# Patient Record
Sex: MALE | Race: AMERICAN INDIAN OR ALASKA NATIVE | Employment: FULL TIME | URBAN - METROPOLITAN AREA
[De-identification: names, ages, dates, MRNs, and addresses within clinical notes are randomized per-mention and may not be internally consistent; named-entity substitution may affect disease eponyms.]

---

## 2018-04-10 ENCOUNTER — HOSPITAL ENCOUNTER (EMERGENCY)
Age: 58
Discharge: HOME | End: 2018-04-10
Attending: EMERGENCY MEDICINE
Payer: COMMERCIAL

## 2018-04-10 VITALS
RESPIRATION RATE: 16 BRPM | HEART RATE: 64 BPM | OXYGEN SATURATION: 98 % | WEIGHT: 192 LBS | HEIGHT: 73 IN | BODY MASS INDEX: 25.45 KG/M2 | SYSTOLIC BLOOD PRESSURE: 166 MMHG | TEMPERATURE: 98.5 F | DIASTOLIC BLOOD PRESSURE: 97 MMHG

## 2018-04-10 DIAGNOSIS — L03.90 CELLULITIS, UNSPECIFIED CELLULITIS SITE: Primary | ICD-10-CM

## 2018-04-10 LAB
ALBUMIN SERPL-MCNC: 4 G/DL (ref 3.5–5)
ALBUMIN/GLOB SERPL: 1.1 {RATIO} (ref 1.1–2.2)
ALP SERPL-CCNC: 99 U/L (ref 45–117)
ALT SERPL-CCNC: 36 U/L (ref 12–78)
ANION GAP SERPL CALC-SCNC: 5 MMOL/L (ref 5–15)
AST SERPL-CCNC: 30 U/L (ref 15–37)
BASOPHILS # BLD: 0 K/UL (ref 0–0.1)
BASOPHILS NFR BLD: 0 % (ref 0–1)
BILIRUB SERPL-MCNC: 1.5 MG/DL (ref 0.2–1)
BUN SERPL-MCNC: 14 MG/DL (ref 6–20)
BUN/CREAT SERPL: 18 (ref 12–20)
CALCIUM SERPL-MCNC: 8.8 MG/DL (ref 8.5–10.1)
CHLORIDE SERPL-SCNC: 107 MMOL/L (ref 97–108)
CO2 SERPL-SCNC: 28 MMOL/L (ref 21–32)
CREAT SERPL-MCNC: 0.78 MG/DL (ref 0.7–1.3)
DIFFERENTIAL METHOD BLD: ABNORMAL
EOSINOPHIL # BLD: 0 K/UL (ref 0–0.4)
EOSINOPHIL NFR BLD: 0 % (ref 0–7)
ERYTHROCYTE [DISTWIDTH] IN BLOOD BY AUTOMATED COUNT: 12.5 % (ref 11.5–14.5)
GLOBULIN SER CALC-MCNC: 3.8 G/DL (ref 2–4)
GLUCOSE SERPL-MCNC: 109 MG/DL (ref 65–100)
HCT VFR BLD AUTO: 42.6 % (ref 36.6–50.3)
HGB BLD-MCNC: 14.3 G/DL (ref 12.1–17)
IMM GRANULOCYTES # BLD: 0 K/UL (ref 0–0.04)
IMM GRANULOCYTES NFR BLD AUTO: 1 % (ref 0–0.5)
LYMPHOCYTES # BLD: 1 K/UL (ref 0.8–3.5)
LYMPHOCYTES NFR BLD: 26 % (ref 12–49)
MCH RBC QN AUTO: 34.1 PG (ref 26–34)
MCHC RBC AUTO-ENTMCNC: 33.6 G/DL (ref 30–36.5)
MCV RBC AUTO: 101.7 FL (ref 80–99)
MONOCYTES # BLD: 0.4 K/UL (ref 0–1)
MONOCYTES NFR BLD: 11 % (ref 5–13)
NEUTS SEG # BLD: 2.5 K/UL (ref 1.8–8)
NEUTS SEG NFR BLD: 63 % (ref 32–75)
NRBC # BLD: 0 K/UL (ref 0–0.01)
NRBC BLD-RTO: 0 PER 100 WBC
PLATELET # BLD AUTO: 144 K/UL (ref 150–400)
PMV BLD AUTO: 9.4 FL (ref 8.9–12.9)
POTASSIUM SERPL-SCNC: 4 MMOL/L (ref 3.5–5.1)
PROT SERPL-MCNC: 7.8 G/DL (ref 6.4–8.2)
RBC # BLD AUTO: 4.19 M/UL (ref 4.1–5.7)
SODIUM SERPL-SCNC: 140 MMOL/L (ref 136–145)
WBC # BLD AUTO: 4 K/UL (ref 4.1–11.1)

## 2018-04-10 PROCEDURE — 93971 EXTREMITY STUDY: CPT

## 2018-04-10 PROCEDURE — 80053 COMPREHEN METABOLIC PANEL: CPT | Performed by: EMERGENCY MEDICINE

## 2018-04-10 PROCEDURE — 99283 EMERGENCY DEPT VISIT LOW MDM: CPT

## 2018-04-10 PROCEDURE — 74011250637 HC RX REV CODE- 250/637: Performed by: EMERGENCY MEDICINE

## 2018-04-10 PROCEDURE — 85025 COMPLETE CBC W/AUTO DIFF WBC: CPT | Performed by: EMERGENCY MEDICINE

## 2018-04-10 PROCEDURE — 36415 COLL VENOUS BLD VENIPUNCTURE: CPT | Performed by: EMERGENCY MEDICINE

## 2018-04-10 RX ORDER — CEPHALEXIN 500 MG/1
500 CAPSULE ORAL
Status: COMPLETED | OUTPATIENT
Start: 2018-04-10 | End: 2018-04-10

## 2018-04-10 RX ORDER — CEPHALEXIN 500 MG/1
500 CAPSULE ORAL 4 TIMES DAILY
Qty: 28 CAP | Refills: 0 | Status: SHIPPED | OUTPATIENT
Start: 2018-04-10 | End: 2018-04-17

## 2018-04-10 RX ADMIN — CEPHALEXIN 500 MG: 500 CAPSULE ORAL at 11:28

## 2018-04-10 NOTE — ED TRIAGE NOTES
Pt reports he was sent here from Dr Thais Humphrey's office to R/O cellulitis and blood clot. Pt has noted redness and swelling to right lower leg. Pt states it started 2 days ago. Pt also C/O right knee pain for the past few weeks. Pt denies injury to leg.

## 2018-04-10 NOTE — PROCEDURES
[address]  *** FINAL REPORT ***    Name: Parth Lima  MRN: [id number]  : 17 Dec 1960  HIS Order #: [id number]  [id number] [facility] Visit #: [id number]  Date: 10 Apr 2018    TYPE OF TEST: Peripheral Venous Testing    REASON FOR TEST  Pain in limb, Limb swelling, cellulitis    Right Leg:-  Deep venous thrombosis:           No  Superficial venous thrombosis:    No  Deep venous insufficiency:        Not examined  Superficial venous insufficiency: Not examined      INTERPRETATION/FINDINGS  PROCEDURE:  Color duplex ultrasound imaging of lower extremity veins. FINDINGS:       Right: The common femoral, deep femoral, femoral, popliteal,  posterior tibial, peroneal, and great saphenous are patent and without   evidence of thrombus; each is is fully compressible and there is no  narrowing of the flow channel on color Doppler imaging. Phasic flow  is observed in the common femoral vein. Left:   The common femoral vein is patent and without evidence of   thrombus. Phasic flow is observed. This extremity was not otherwise   evaluated. IMPRESSION:  No evidence of right lower extremity vein thrombosis. ADDITIONAL COMMENTS    I have personally reviewed the data relevant to the interpretation of  this  study.     TECHNOLOGIST: Aneesh Mares RVT  Signed: 04/10/2018 11:02 AM    PHYSICIAN: Tara Esposito MD  Signed: 2018 06:30 AM

## 2018-04-10 NOTE — LETTER
[address] 
[address] [facility] [address] 
[phone] Work/School Note Date: 4/10/2018 To Whom It May concern: Mariel Trotter was seen and treated today in the emergency room by the following provider(s): 
Attending Provider: Shakir Pimentel MD. Mariel Trotter may return to work on 4/13/18.  
 
Sincerely, 
 
 
 
 
Shakir Pimentel MD

## 2018-04-10 NOTE — ED PROVIDER NOTES
HPI Comments: 61 y.o. male with no significant past medical history who presents with chief complaint of right lower leg swelling. Patient reports right knee pain began two weeks ago and yesterday he noticed redness, swelling, and warmth to right lower leg. Patient has no prior history of similar symptoms. Patient denies fever, chest pain, shortness of breath, abdominal pain, vomiting. There are no other acute medical concerns at this time. Social hx: Nonsmoker  PCP: Bhupinder Hilton MD    Note written by Rhonda Mar, as dictated by Cole Foster MD 10:42 AM      The history is provided by the patient. Past Medical History:   Diagnosis Date    Environmental and seasonal allergies        Past Surgical History:   Procedure Laterality Date    HX ORTHOPAEDIC           History reviewed. No pertinent family history. Social History     Social History    Marital status:      Spouse name: N/A    Number of children: N/A    Years of education: N/A     Occupational History    Not on file. Social History Main Topics    Smoking status: Never Smoker    Smokeless tobacco: Never Used    Alcohol use 6.0 oz/week     10 Cans of beer per week    Drug use: No    Sexual activity: Not on file     Other Topics Concern    Not on file     Social History Narrative    No narrative on file         ALLERGIES: Review of patient's allergies indicates no known allergies. Review of Systems   Constitutional: Negative for fever. Eyes: Negative for visual disturbance. Respiratory: Negative for cough, shortness of breath and wheezing. Cardiovascular: Positive for leg swelling (right lower leg). Negative for chest pain. Gastrointestinal: Negative for abdominal pain, diarrhea, nausea and vomiting. Genitourinary: Negative for dysuria. Musculoskeletal: Negative. Negative for back pain and neck stiffness. Skin: Positive for color change (redness to right lower leg). Negative for rash. Neurological: Negative. Negative for syncope and headaches. Psychiatric/Behavioral: Negative for confusion. All other systems reviewed and are negative. Vitals:    04/10/18 0947   BP: 165/90   Pulse: 69   Resp: 16   Temp: 98 °F (36.7 °C)   SpO2: 99%   Weight: 87.1 kg (192 lb)   Height: 6' 1\" (1.854 m)            Physical Exam   Constitutional: He appears well-developed and well-nourished. No distress. HENT:   Head: Normocephalic. Eyes: Pupils are equal, round, and reactive to light. Neck: Normal range of motion. Cardiovascular: Normal rate and regular rhythm. No murmur heard. Pulmonary/Chest: Effort normal and breath sounds normal. No respiratory distress. Abdominal: Soft. There is no tenderness. Musculoskeletal: Normal range of motion. He exhibits edema. Right lower leg: He exhibits edema (1+ edema). Right lower leg: redness and warmth, edema. Pulses intact. Neurological: He is alert. He has normal strength. No cranial nerve deficit. Skin: Skin is warm and dry. Psychiatric: He has a normal mood and affect. His behavior is normal.   Nursing note and vitals reviewed. Note written by Jose Delcid, as dictated by Kimberly Sullivan MD 10:43 AM       [facility]      ED Course       Procedures    Discussed test results, clinical impression,  and need for followup in 1-2 days if not improving. Patients questions were answered. Discharge instructions given to patient.

## 2018-10-15 ENCOUNTER — HOSPITAL ENCOUNTER (OUTPATIENT)
Dept: PREADMISSION TESTING | Age: 58
Discharge: HOME | End: 2018-10-15
Payer: COMMERCIAL

## 2018-10-15 VITALS
BODY MASS INDEX: 26.03 KG/M2 | TEMPERATURE: 98 F | DIASTOLIC BLOOD PRESSURE: 87 MMHG | SYSTOLIC BLOOD PRESSURE: 159 MMHG | WEIGHT: 196.38 LBS | HEIGHT: 73 IN | HEART RATE: 65 BPM

## 2018-10-15 LAB
ABO + RH BLD: NORMAL
ANION GAP SERPL CALC-SCNC: 7 MMOL/L (ref 5–15)
APPEARANCE UR: CLEAR
ATRIAL RATE: 57 BPM
BACTERIA URNS QL MICRO: NEGATIVE /HPF
BILIRUB UR QL: NEGATIVE
BLOOD GROUP ANTIBODIES SERPL: NORMAL
BUN SERPL-MCNC: 12 MG/DL (ref 6–20)
BUN/CREAT SERPL: 13 (ref 12–20)
CALCIUM SERPL-MCNC: 8.9 MG/DL (ref 8.5–10.1)
CALCULATED P AXIS, ECG09: 11 DEGREES
CALCULATED R AXIS, ECG10: -34 DEGREES
CALCULATED T AXIS, ECG11: 84 DEGREES
CHLORIDE SERPL-SCNC: 104 MMOL/L (ref 97–108)
CO2 SERPL-SCNC: 28 MMOL/L (ref 21–32)
COLOR UR: NORMAL
CREAT SERPL-MCNC: 0.89 MG/DL (ref 0.7–1.3)
DIAGNOSIS, 93000: NORMAL
EPITH CASTS URNS QL MICRO: NORMAL /LPF
ERYTHROCYTE [DISTWIDTH] IN BLOOD BY AUTOMATED COUNT: 11.9 % (ref 11.5–14.5)
EST. AVERAGE GLUCOSE BLD GHB EST-MCNC: 105 MG/DL
GLUCOSE SERPL-MCNC: 102 MG/DL (ref 65–100)
GLUCOSE UR STRIP.AUTO-MCNC: NEGATIVE MG/DL
HBA1C MFR BLD: 5.3 % (ref 4.2–6.3)
HCT VFR BLD AUTO: 43.3 % (ref 36.6–50.3)
HGB BLD-MCNC: 15 G/DL (ref 12.1–17)
HGB UR QL STRIP: NEGATIVE
HYALINE CASTS URNS QL MICRO: NORMAL /LPF (ref 0–5)
INR PPP: 1 (ref 0.9–1.1)
KETONES UR QL STRIP.AUTO: NEGATIVE MG/DL
LEUKOCYTE ESTERASE UR QL STRIP.AUTO: NEGATIVE
MCH RBC QN AUTO: 35.6 PG (ref 26–34)
MCHC RBC AUTO-ENTMCNC: 34.6 G/DL (ref 30–36.5)
MCV RBC AUTO: 102.9 FL (ref 80–99)
NITRITE UR QL STRIP.AUTO: NEGATIVE
NRBC # BLD: 0 K/UL (ref 0–0.01)
NRBC BLD-RTO: 0 PER 100 WBC
P-R INTERVAL, ECG05: 206 MS
PH UR STRIP: 6.5 [PH] (ref 5–8)
PLATELET # BLD AUTO: 164 K/UL (ref 150–400)
PMV BLD AUTO: 10.1 FL (ref 8.9–12.9)
POTASSIUM SERPL-SCNC: 4.1 MMOL/L (ref 3.5–5.1)
PROT UR STRIP-MCNC: NEGATIVE MG/DL
PROTHROMBIN TIME: 9.9 SEC (ref 9–11.1)
Q-T INTERVAL, ECG07: 492 MS
QRS DURATION, ECG06: 166 MS
QTC CALCULATION (BEZET), ECG08: 478 MS
RBC # BLD AUTO: 4.21 M/UL (ref 4.1–5.7)
RBC #/AREA URNS HPF: NORMAL /HPF (ref 0–5)
SODIUM SERPL-SCNC: 139 MMOL/L (ref 136–145)
SP GR UR REFRACTOMETRY: 1.01 (ref 1–1.03)
SPECIMEN EXP DATE BLD: NORMAL
UA: UC IF INDICATED,UAUC: NORMAL
UROBILINOGEN UR QL STRIP.AUTO: 0.2 EU/DL (ref 0.2–1)
VENTRICULAR RATE, ECG03: 57 BPM
WBC # BLD AUTO: 3.5 K/UL (ref 4.1–11.1)
WBC URNS QL MICRO: NORMAL /HPF (ref 0–4)

## 2018-10-15 PROCEDURE — 85610 PROTHROMBIN TIME: CPT | Performed by: ORTHOPAEDIC SURGERY

## 2018-10-15 PROCEDURE — 80048 BASIC METABOLIC PNL TOTAL CA: CPT | Performed by: ORTHOPAEDIC SURGERY

## 2018-10-15 PROCEDURE — 81001 URINALYSIS AUTO W/SCOPE: CPT | Performed by: ORTHOPAEDIC SURGERY

## 2018-10-15 PROCEDURE — 93005 ELECTROCARDIOGRAM TRACING: CPT

## 2018-10-15 PROCEDURE — 85027 COMPLETE CBC AUTOMATED: CPT | Performed by: ORTHOPAEDIC SURGERY

## 2018-10-15 PROCEDURE — 86900 BLOOD TYPING SEROLOGIC ABO: CPT | Performed by: ORTHOPAEDIC SURGERY

## 2018-10-15 PROCEDURE — 83036 HEMOGLOBIN GLYCOSYLATED A1C: CPT | Performed by: ORTHOPAEDIC SURGERY

## 2018-10-15 RX ORDER — MONTELUKAST SODIUM 10 MG/1
10 TABLET ORAL
COMMUNITY
End: 2022-02-04

## 2018-10-16 PROBLEM — I44.7 LBBB (LEFT BUNDLE BRANCH BLOCK): Status: ACTIVE | Noted: 2018-10-16

## 2018-10-16 LAB
BACTERIA SPEC CULT: NORMAL
BACTERIA SPEC CULT: NORMAL
SERVICE CMNT-IMP: NORMAL

## 2018-10-16 NOTE — ADVANCED PRACTICE NURSE
PAT EKG with new LBBB. Per Dr. Mary Verduzco, patient needs cardiac evaluation prior to surgery. I have contacted patient to inform, ensured no cardiac hx or previous LBBB. Patient's wife, Yanet Botello, will set up appointment with her cardiologist, Dr. Martha Casey and return call with time and date. I have called to inform Dr. Amber Hope team, message left for [place], [place]. Yanet Croon called to confirm appt with Dr. Mireille Ayala  [phone] 10/23.

## 2018-10-25 NOTE — ADVANCED PRACTICE NURSE
Per patient, surgery cancelled due to need of further cardiac evaluation, including cardiac cath. I have requested cardiac notes from Dr. Ioana Gillepsie office. Patient and cardiologist have informed Dr. Laddie Phoenix team. Surgery cancelled in [address] [place] [address].

## 2018-11-01 PROBLEM — M17.31 POST-TRAUMATIC OSTEOARTHRITIS OF RIGHT KNEE: Status: ACTIVE | Noted: 2018-11-01

## 2018-11-01 NOTE — ADVANCED PRACTICE NURSE
Per Dr Francisco J Mar nurse, patient had cardiac cath and has been cleared for surgery. Cardiac clearance note received and sent for scanning.

## 2018-11-01 NOTE — H&P
[]Wes for attribution information Subjective:  
Patient ID: Cherry Britton is a 61 y.o. male. Pain Score:   5 Chief Complaint: Follow-up of the Right Knee 
  
  
24-year-old male now seven weeks out from his knee scope. He has really no better. He continues to have pain and swelling in the knee and overall he is really uncomfortable. He is back in for re-evaluation and possible discussion of total knee replacement. His pain is continued to be most mostly medial.  He has fairly uncomfortable and it has gotten swollen again despite the aspiration injection last time. He just does not seem to making any progress and is frustrated. Had a lot of members of his family have knee replacement and they have done well. They all had total knee replacements and not partials. 
  
Medical History Past Medical History:  
Diagnosis Date  no relevant past medical history    
  
  
Surgical History Past Surgical History:  
Procedure Laterality Date  HAND SURGERY      
 KNEE SURGERY      
 NO RELEVANT SURGERIES      
 SPINE SURGERY      
  
  
     
Family History Problem Relation Age of Onset  No Known Problems Mother    
 No Known Problems Father    
 No Known Problems Brother    
 No Known Problems Sister    
 Clotting disorder Brother    
  
Social History Social History  
  
     
Social History  Marital status:   
    Spouse name: N/A  
 Number of children: N/A  
 Years of education: N/A  
  
    
Social History Main Topics  Smoking status: Never Smoker  Smokeless tobacco: Never Used  Alcohol use Yes  Drug use: No  
 Sexual activity: Not Asked  
  
    
Other Topics Concern  None  
  
Social History Narrative  None  
  
  
Review of Systems 10/9/2018 
  
Constitutional: Unexplained: Negative Genitourinary: Frequent Urination: Negative HEENT: Vision Loss: Negative Neurological: Memory Loss: Negative Integumentary: Rash: Negative Cardiovascular: Palpatations: Negative Hematologic: Bruises/Bleeds Easily: Negative Gastrointestinal: Constipation: Negative Immunological: Seasonal Allergies: Positive Musculoskeletal: Joint Pain: Positive Objective:  
  
Vitals Vitals:  
  10/09/18 0250 Weight: 192 lb Height: 6' 1\"  
  
  
Constitutional:  No acute distress. Well nourished. Well developed. Psychiatric: Alert and oriented x3. Skin:  No marked skin ulcers. Neurological:  No apparent deficits 
  
 
Patient Active Problem List  
 Diagnosis Date Noted  Osteoarthritis of right knee 11/02/2018  Post-traumatic osteoarthritis of right knee 11/01/2018  LBBB (left bundle branch block) 10/16/2018 Past Medical History:  
Diagnosis Date  Arthritis  Chronic pain  Environmental and seasonal allergies  LBBB (left bundle branch block) 10/16/2018  Nausea & vomiting Past Surgical History:  
Procedure Laterality Date  HX CERVICAL FUSION  2006  HX GI    
 COLONOSCOPY  
 [address]  HX ORTHOPAEDIC Left 2009 HAND SURGERY   
 [address] [facility] Prior to Admission medications Medication Sig Start Date End Date Taking? Authorizing Provider  
montelukast (SINGULAIR) 10 mg tablet Take 10 mg by mouth nightly. Yes Provider, Historical  
 
No Known Allergies Social History Tobacco Use  Smoking status: Never Smoker  Smokeless tobacco: Never Used Substance Use Topics  Alcohol use: Yes Alcohol/week: 3.6 oz Types: 6 Cans of beer per week Family History Problem Relation Age of Onset  Cancer Father JAW  Anesth Problems Neg Hx Patient Vitals for the past 8 hrs: 
 BP Temp Pulse Resp SpO2 Height Weight  
11/02/18 0822 (!) 147/92 98.5 °F (36.9 °C) 61 16 97 % 6' 1\" (1.854 m) 88.9 kg (196 lb) General appearance: alert, cooperative, no distress, appears stated age Head: Normocephalic, without obvious abnormality, atraumatic Lungs: clear to auscultation bilaterally Heart: regular rate and rhythm, S1, S2 normal, no murmur Abdomen: soft, non-tender. Bowel sounds normal. No masses,  no organomegaly Extremities: He walks with an antalgic gait wearing his  brace on his affected right knee. Right knee:  No skin changes, rashes, erythema, deformity, or bruising. Moderate effusion. Tenderness in the medial joint line. Mild varus alignment. Pseudo opening on valgus stress testing. No opening on varus or anterior-posterior stress testing. Slight crepitance along the medial joint line. Negative Lachman's negative drawer. Normal patellar tracking. Good quad and hamstring strength good pulses good sensation good cap refill and no edema distally. Range of motion is basically full. Left knee exam has normal alignment and range of motion with no pain. There is good stability in all planes and no crepitance and no effusion. The Lachman's and drawer are negative. The knee is stable to varus and valgus stress testing. The patella tracks well. There is no joint line tenderness. The leg is neurovascular intact distally with no skin changes rashes erythema deformity or bruising about the leg. There is no edema and good pulses distally. Pulses: 2+ and symmetric Neurologic: Grossly normal 
 
 
 
 
  
Radiographs:   
  
  
 No imaging obtained   
  
Assessment:  
  
1. Primary osteoarthritis of right knee 2. Effusion, right knee   
  
   
Patient Active Problem List  
Diagnosis  Acute deep vein thrombosis (DVT) of proximal vein of left lower extremity  Primary osteoarthritis of right knee  
  
Plan:  
  
He has failed conservative management with injection rest activity modification bracing anti-inflammatories and diagnostic arthroscopy with chondroplasty. He is not making any progress and he is almost out of disability days at work. He cannot do his job.   At this point  We will go ahead and set him up for a total knee arthroplasty. We talked briefly about partial knee replacement but he is not interested in that. He has lot of family that had a total and he would prefer to go that route. We talked about this at length today. Total knee replacement was discussed with the patient today including the risks benefits and possible complications. It was discussed with them that the surgery is done on same day surgery basis. The patient will arrive the day of surgery. Surgery will be done under spinal and block with sedation. The main risks of the operation are blood loss infection and persistent pain. Surgery would last approximately an hour and a half the patient was then go to the floor with a would be expected to walk on the 1st day. Prior to discharge that would be required to walk a certain distance be able to get up on their own and ambulate stairs. Patient would be required to be on a blood thinner after surgery. This will be required for at least 4 weeks postop. Will complete recovery is expected take 3-6 months. All this was explained to the patient they voiced complete understanding. It would be expected that the patient would require postoperative pain medicine for up to 8 weeks after surgery. Patient desired to go forward with surgery and will be schedule. 
  
   
Orders Placed This Encounter  BMI >=25 PATIENT INSTRUCTIONS & EDUCATION Return in about 2 weeks (around 10/23/2018) for surgery. Aleah Hudson MD   
  
 
Patient was seen and examined. There have been no significant clinical changes since the completion of the above History and Physical. 
Patient identified by surgeon; surgical site was confirmed by patient and surgeon.

## 2018-11-02 ENCOUNTER — HOSPITAL ENCOUNTER (INPATIENT)
Age: 58
LOS: 1 days | Discharge: HOME HEALTH | DRG: 470 | End: 2018-11-03
Attending: ORTHOPAEDIC SURGERY | Admitting: ORTHOPAEDIC SURGERY
Payer: COMMERCIAL

## 2018-11-02 ENCOUNTER — APPOINTMENT (OUTPATIENT)
Dept: GENERAL RADIOLOGY | Age: 58
DRG: 470 | End: 2018-11-02
Attending: ORTHOPAEDIC SURGERY
Payer: COMMERCIAL

## 2018-11-02 ENCOUNTER — ANESTHESIA EVENT (OUTPATIENT)
Dept: SURGERY | Age: 58
DRG: 470 | End: 2018-11-02
Payer: COMMERCIAL

## 2018-11-02 ENCOUNTER — ANESTHESIA (OUTPATIENT)
Dept: SURGERY | Age: 58
DRG: 470 | End: 2018-11-02
Payer: COMMERCIAL

## 2018-11-02 DIAGNOSIS — M17.11 PRIMARY OSTEOARTHRITIS OF RIGHT KNEE: Primary | ICD-10-CM

## 2018-11-02 LAB
ABO + RH BLD: NORMAL
BLOOD GROUP ANTIBODIES SERPL: NORMAL
GLUCOSE BLD STRIP.AUTO-MCNC: 107 MG/DL (ref 65–100)
SERVICE CMNT-IMP: ABNORMAL
SPECIMEN EXP DATE BLD: NORMAL

## 2018-11-02 PROCEDURE — 74011000250 HC RX REV CODE- 250: Performed by: ORTHOPAEDIC SURGERY

## 2018-11-02 PROCEDURE — 74011250636 HC RX REV CODE- 250/636

## 2018-11-02 PROCEDURE — 74011000250 HC RX REV CODE- 250

## 2018-11-02 PROCEDURE — 76010000171 HC OR TIME 2 TO 2.5 HR INTENSV-TIER 1: Performed by: ORTHOPAEDIC SURGERY

## 2018-11-02 PROCEDURE — 74011250637 HC RX REV CODE- 250/637: Performed by: ORTHOPAEDIC SURGERY

## 2018-11-02 PROCEDURE — 77030020788: Performed by: ORTHOPAEDIC SURGERY

## 2018-11-02 PROCEDURE — 74011000258 HC RX REV CODE- 258

## 2018-11-02 PROCEDURE — 77030034850: Performed by: ORTHOPAEDIC SURGERY

## 2018-11-02 PROCEDURE — C9290 INJ, BUPIVACAINE LIPOSOME: HCPCS | Performed by: ORTHOPAEDIC SURGERY

## 2018-11-02 PROCEDURE — 74011000258 HC RX REV CODE- 258: Performed by: ORTHOPAEDIC SURGERY

## 2018-11-02 PROCEDURE — 77030038020 HC MANFLD NEPTUNE STRY -B: Performed by: ORTHOPAEDIC SURGERY

## 2018-11-02 PROCEDURE — 77030011640 HC PAD GRND REM COVD -A: Performed by: ORTHOPAEDIC SURGERY

## 2018-11-02 PROCEDURE — 77030014077 HC TOWER MX CEM J&J -C: Performed by: ORTHOPAEDIC SURGERY

## 2018-11-02 PROCEDURE — 77030012935 HC DRSG AQUACEL BMS -B: Performed by: ORTHOPAEDIC SURGERY

## 2018-11-02 PROCEDURE — 82962 GLUCOSE BLOOD TEST: CPT

## 2018-11-02 PROCEDURE — 77030039497 HC CST PAD STERILE CHCS -A: Performed by: ORTHOPAEDIC SURGERY

## 2018-11-02 PROCEDURE — 77030018846 HC SOL IRR STRL H20 ICUM -A: Performed by: ORTHOPAEDIC SURGERY

## 2018-11-02 PROCEDURE — 0SRC0J9 REPLACEMENT OF RIGHT KNEE JOINT WITH SYNTHETIC SUBSTITUTE, CEMENTED, OPEN APPROACH: ICD-10-PCS | Performed by: ORTHOPAEDIC SURGERY

## 2018-11-02 PROCEDURE — 77030035236 HC SUT PDS STRATFX BARB J&J -B: Performed by: ORTHOPAEDIC SURGERY

## 2018-11-02 PROCEDURE — 74011250637 HC RX REV CODE- 250/637: Performed by: PHYSICIAN ASSISTANT

## 2018-11-02 PROCEDURE — 77030000032 HC CUF TRNQT ZIMM -B: Performed by: ORTHOPAEDIC SURGERY

## 2018-11-02 PROCEDURE — 36415 COLL VENOUS BLD VENIPUNCTURE: CPT | Performed by: ORTHOPAEDIC SURGERY

## 2018-11-02 PROCEDURE — C1713 ANCHOR/SCREW BN/BN,TIS/BN: HCPCS | Performed by: ORTHOPAEDIC SURGERY

## 2018-11-02 PROCEDURE — 74011250636 HC RX REV CODE- 250/636: Performed by: ORTHOPAEDIC SURGERY

## 2018-11-02 PROCEDURE — C1776 JOINT DEVICE (IMPLANTABLE): HCPCS | Performed by: ORTHOPAEDIC SURGERY

## 2018-11-02 PROCEDURE — 77030018836 HC SOL IRR NACL ICUM -A: Performed by: ORTHOPAEDIC SURGERY

## 2018-11-02 PROCEDURE — 74011000250 HC RX REV CODE- 250: Performed by: ANESTHESIOLOGY

## 2018-11-02 PROCEDURE — 77030031139 HC SUT VCRL2 J&J -A: Performed by: ORTHOPAEDIC SURGERY

## 2018-11-02 PROCEDURE — 73560 X-RAY EXAM OF KNEE 1 OR 2: CPT

## 2018-11-02 PROCEDURE — 86900 BLOOD TYPING SEROLOGIC ABO: CPT | Performed by: ORTHOPAEDIC SURGERY

## 2018-11-02 PROCEDURE — 65270000029 HC RM PRIVATE

## 2018-11-02 PROCEDURE — 77030032490 HC SLV COMPR SCD KNE COVD -B: Performed by: ORTHOPAEDIC SURGERY

## 2018-11-02 PROCEDURE — 74011250636 HC RX REV CODE- 250/636: Performed by: ANESTHESIOLOGY

## 2018-11-02 PROCEDURE — 76060000035 HC ANESTHESIA 2 TO 2.5 HR: Performed by: ORTHOPAEDIC SURGERY

## 2018-11-02 PROCEDURE — 74011250636 HC RX REV CODE- 250/636: Performed by: PHYSICIAN ASSISTANT

## 2018-11-02 PROCEDURE — 77030002912 HC SUT ETHBND J&J -A: Performed by: ORTHOPAEDIC SURGERY

## 2018-11-02 PROCEDURE — 77030007866 HC KT SPN ANES BBMI -B: Performed by: NURSE ANESTHETIST, CERTIFIED REGISTERED

## 2018-11-02 PROCEDURE — 76210000016 HC OR PH I REC 1 TO 1.5 HR: Performed by: ORTHOPAEDIC SURGERY

## 2018-11-02 PROCEDURE — 77030016547 HC BLD SAW SAG1 STRY -B: Performed by: ORTHOPAEDIC SURGERY

## 2018-11-02 DEVICE — IMPLANTABLE DEVICE
Type: IMPLANTABLE DEVICE | Site: KNEE | Status: FUNCTIONAL
Brand: PERSONA® VIVACIT-E®

## 2018-11-02 DEVICE — IMPLANTABLE DEVICE
Type: IMPLANTABLE DEVICE | Site: KNEE | Status: FUNCTIONAL
Brand: PERSONA™

## 2018-11-02 DEVICE — IMPLANTABLE DEVICE
Type: IMPLANTABLE DEVICE | Site: KNEE | Status: FUNCTIONAL
Brand: PERSONA® NATURAL TIBIA®

## 2018-11-02 DEVICE — IMPLANTABLE DEVICE
Type: IMPLANTABLE DEVICE | Site: KNEE | Status: FUNCTIONAL
Brand: PERSONA®

## 2018-11-02 DEVICE — KIT TKR CEM VIT E SURF AND INSTRMT: Type: IMPLANTABLE DEVICE | Site: KNEE | Status: FUNCTIONAL

## 2018-11-02 DEVICE — SMARTSET HIGH PERFORMANCE MV MEDIUM VISCOSITY BONE CEMENT 40G
Type: IMPLANTABLE DEVICE | Site: KNEE | Status: FUNCTIONAL
Brand: SMARTSET

## 2018-11-02 RX ORDER — MIDAZOLAM HYDROCHLORIDE 1 MG/ML
1 INJECTION, SOLUTION INTRAMUSCULAR; INTRAVENOUS AS NEEDED
Status: DISCONTINUED | OUTPATIENT
Start: 2018-11-02 | End: 2018-11-02 | Stop reason: HOSPADM

## 2018-11-02 RX ORDER — MORPHINE SULFATE 10 MG/ML
2 INJECTION, SOLUTION INTRAMUSCULAR; INTRAVENOUS
Status: DISCONTINUED | OUTPATIENT
Start: 2018-11-02 | End: 2018-11-02 | Stop reason: HOSPADM

## 2018-11-02 RX ORDER — SODIUM CHLORIDE 0.9 % (FLUSH) 0.9 %
5-10 SYRINGE (ML) INJECTION AS NEEDED
Status: DISCONTINUED | OUTPATIENT
Start: 2018-11-02 | End: 2018-11-03 | Stop reason: HOSPADM

## 2018-11-02 RX ORDER — ONDANSETRON 2 MG/ML
4 INJECTION INTRAMUSCULAR; INTRAVENOUS
Status: DISPENSED | OUTPATIENT
Start: 2018-11-02 | End: 2018-11-03

## 2018-11-02 RX ORDER — OXYCODONE HYDROCHLORIDE 5 MG/1
5 TABLET ORAL AS NEEDED
Status: DISCONTINUED | OUTPATIENT
Start: 2018-11-02 | End: 2018-11-02 | Stop reason: HOSPADM

## 2018-11-02 RX ORDER — ROPIVACAINE HYDROCHLORIDE 5 MG/ML
150 INJECTION, SOLUTION EPIDURAL; INFILTRATION; PERINEURAL AS NEEDED
Status: DISCONTINUED | OUTPATIENT
Start: 2018-11-02 | End: 2018-11-02 | Stop reason: HOSPADM

## 2018-11-02 RX ORDER — SODIUM CHLORIDE 9 MG/ML
125 INJECTION, SOLUTION INTRAVENOUS CONTINUOUS
Status: DISPENSED | OUTPATIENT
Start: 2018-11-02 | End: 2018-11-03

## 2018-11-02 RX ORDER — OXYCODONE HYDROCHLORIDE 5 MG/1
10 TABLET ORAL
Status: DISCONTINUED | OUTPATIENT
Start: 2018-11-02 | End: 2018-11-03 | Stop reason: HOSPADM

## 2018-11-02 RX ORDER — CEFAZOLIN SODIUM/WATER 2 G/20 ML
2 SYRINGE (ML) INTRAVENOUS EVERY 8 HOURS
Status: COMPLETED | OUTPATIENT
Start: 2018-11-02 | End: 2018-11-03

## 2018-11-02 RX ORDER — KETAMINE HYDROCHLORIDE 10 MG/ML
INJECTION, SOLUTION INTRAMUSCULAR; INTRAVENOUS AS NEEDED
Status: DISCONTINUED | OUTPATIENT
Start: 2018-11-02 | End: 2018-11-02 | Stop reason: HOSPADM

## 2018-11-02 RX ORDER — SODIUM CHLORIDE 9 MG/ML
25 INJECTION, SOLUTION INTRAVENOUS CONTINUOUS
Status: DISCONTINUED | OUTPATIENT
Start: 2018-11-02 | End: 2018-11-02 | Stop reason: HOSPADM

## 2018-11-02 RX ORDER — LIDOCAINE HYDROCHLORIDE 10 MG/ML
0.1 INJECTION, SOLUTION EPIDURAL; INFILTRATION; INTRACAUDAL; PERINEURAL AS NEEDED
Status: DISCONTINUED | OUTPATIENT
Start: 2018-11-02 | End: 2018-11-02 | Stop reason: HOSPADM

## 2018-11-02 RX ORDER — ACETAMINOPHEN 500 MG
1000 TABLET ORAL EVERY 6 HOURS
Status: DISCONTINUED | OUTPATIENT
Start: 2018-11-02 | End: 2018-11-03 | Stop reason: HOSPADM

## 2018-11-02 RX ORDER — DIPHENHYDRAMINE HCL 25 MG
25 CAPSULE ORAL
Status: DISCONTINUED | OUTPATIENT
Start: 2018-11-02 | End: 2018-11-03 | Stop reason: HOSPADM

## 2018-11-02 RX ORDER — ONDANSETRON 2 MG/ML
INJECTION INTRAMUSCULAR; INTRAVENOUS AS NEEDED
Status: DISCONTINUED | OUTPATIENT
Start: 2018-11-02 | End: 2018-11-02 | Stop reason: HOSPADM

## 2018-11-02 RX ORDER — PHENYLEPHRINE HCL IN 0.9% NACL 0.4MG/10ML
SYRINGE (ML) INTRAVENOUS AS NEEDED
Status: DISCONTINUED | OUTPATIENT
Start: 2018-11-02 | End: 2018-11-02 | Stop reason: HOSPADM

## 2018-11-02 RX ORDER — ROPIVACAINE HYDROCHLORIDE 5 MG/ML
INJECTION, SOLUTION EPIDURAL; INFILTRATION; PERINEURAL
Status: COMPLETED | OUTPATIENT
Start: 2018-11-02 | End: 2018-11-02

## 2018-11-02 RX ORDER — EPHEDRINE SULFATE 50 MG/ML
25 INJECTION, SOLUTION INTRAVENOUS ONCE
Status: COMPLETED | OUTPATIENT
Start: 2018-11-02 | End: 2018-11-02

## 2018-11-02 RX ORDER — EPHEDRINE SULFATE 50 MG/ML
INJECTION, SOLUTION INTRAVENOUS
Status: COMPLETED
Start: 2018-11-02 | End: 2018-11-02

## 2018-11-02 RX ORDER — POLYETHYLENE GLYCOL 3350 17 G/17G
17 POWDER, FOR SOLUTION ORAL DAILY
Status: DISCONTINUED | OUTPATIENT
Start: 2018-11-03 | End: 2018-11-03 | Stop reason: HOSPADM

## 2018-11-02 RX ORDER — SODIUM CHLORIDE 0.9 % (FLUSH) 0.9 %
5-10 SYRINGE (ML) INJECTION EVERY 8 HOURS
Status: DISCONTINUED | OUTPATIENT
Start: 2018-11-02 | End: 2018-11-02 | Stop reason: HOSPADM

## 2018-11-02 RX ORDER — ENOXAPARIN SODIUM 100 MG/ML
40 INJECTION SUBCUTANEOUS DAILY
Status: DISCONTINUED | OUTPATIENT
Start: 2018-11-03 | End: 2018-11-03 | Stop reason: HOSPADM

## 2018-11-02 RX ORDER — MONTELUKAST SODIUM 10 MG/1
10 TABLET ORAL
Status: DISCONTINUED | OUTPATIENT
Start: 2018-11-02 | End: 2018-11-03 | Stop reason: HOSPADM

## 2018-11-02 RX ORDER — BUPIVACAINE HYDROCHLORIDE 5 MG/ML
INJECTION, SOLUTION EPIDURAL; INTRACAUDAL AS NEEDED
Status: DISCONTINUED | OUTPATIENT
Start: 2018-11-02 | End: 2018-11-02 | Stop reason: HOSPADM

## 2018-11-02 RX ORDER — DEXAMETHASONE SODIUM PHOSPHATE 4 MG/ML
INJECTION, SOLUTION INTRA-ARTICULAR; INTRALESIONAL; INTRAMUSCULAR; INTRAVENOUS; SOFT TISSUE AS NEEDED
Status: DISCONTINUED | OUTPATIENT
Start: 2018-11-02 | End: 2018-11-02 | Stop reason: HOSPADM

## 2018-11-02 RX ORDER — SODIUM CHLORIDE, SODIUM LACTATE, POTASSIUM CHLORIDE, CALCIUM CHLORIDE 600; 310; 30; 20 MG/100ML; MG/100ML; MG/100ML; MG/100ML
100 INJECTION, SOLUTION INTRAVENOUS CONTINUOUS
Status: DISCONTINUED | OUTPATIENT
Start: 2018-11-02 | End: 2018-11-02 | Stop reason: HOSPADM

## 2018-11-02 RX ORDER — SODIUM CHLORIDE, SODIUM LACTATE, POTASSIUM CHLORIDE, CALCIUM CHLORIDE 600; 310; 30; 20 MG/100ML; MG/100ML; MG/100ML; MG/100ML
1000 INJECTION, SOLUTION INTRAVENOUS CONTINUOUS
Status: DISCONTINUED | OUTPATIENT
Start: 2018-11-02 | End: 2018-11-02 | Stop reason: HOSPADM

## 2018-11-02 RX ORDER — FENTANYL CITRATE 50 UG/ML
25 INJECTION, SOLUTION INTRAMUSCULAR; INTRAVENOUS
Status: DISCONTINUED | OUTPATIENT
Start: 2018-11-02 | End: 2018-11-02 | Stop reason: HOSPADM

## 2018-11-02 RX ORDER — ACETAMINOPHEN 500 MG
1000 TABLET ORAL ONCE
Status: COMPLETED | OUTPATIENT
Start: 2018-11-02 | End: 2018-11-02

## 2018-11-02 RX ORDER — MIDAZOLAM HYDROCHLORIDE 1 MG/ML
INJECTION, SOLUTION INTRAMUSCULAR; INTRAVENOUS AS NEEDED
Status: DISCONTINUED | OUTPATIENT
Start: 2018-11-02 | End: 2018-11-02 | Stop reason: HOSPADM

## 2018-11-02 RX ORDER — CELECOXIB 200 MG/1
200 CAPSULE ORAL 2 TIMES DAILY
Status: DISCONTINUED | OUTPATIENT
Start: 2018-11-02 | End: 2018-11-03 | Stop reason: HOSPADM

## 2018-11-02 RX ORDER — OXYCODONE HYDROCHLORIDE 5 MG/1
5 TABLET ORAL
Status: DISCONTINUED | OUTPATIENT
Start: 2018-11-02 | End: 2018-11-03 | Stop reason: HOSPADM

## 2018-11-02 RX ORDER — FAMOTIDINE 20 MG/1
20 TABLET, FILM COATED ORAL 2 TIMES DAILY
Status: DISCONTINUED | OUTPATIENT
Start: 2018-11-02 | End: 2018-11-03 | Stop reason: HOSPADM

## 2018-11-02 RX ORDER — DEXMEDETOMIDINE HYDROCHLORIDE 4 UG/ML
INJECTION, SOLUTION INTRAVENOUS AS NEEDED
Status: DISCONTINUED | OUTPATIENT
Start: 2018-11-02 | End: 2018-11-02 | Stop reason: HOSPADM

## 2018-11-02 RX ORDER — CEFAZOLIN SODIUM/WATER 2 G/20 ML
2 SYRINGE (ML) INTRAVENOUS ONCE
Status: COMPLETED | OUTPATIENT
Start: 2018-11-02 | End: 2018-11-02

## 2018-11-02 RX ORDER — EPHEDRINE SULFATE 50 MG/ML
INJECTION, SOLUTION INTRAVENOUS AS NEEDED
Status: DISCONTINUED | OUTPATIENT
Start: 2018-11-02 | End: 2018-11-02 | Stop reason: HOSPADM

## 2018-11-02 RX ORDER — SODIUM CHLORIDE 0.9 % (FLUSH) 0.9 %
5-10 SYRINGE (ML) INJECTION AS NEEDED
Status: DISCONTINUED | OUTPATIENT
Start: 2018-11-02 | End: 2018-11-02 | Stop reason: HOSPADM

## 2018-11-02 RX ORDER — FACIAL-BODY WIPES
10 EACH TOPICAL DAILY PRN
Status: DISCONTINUED | OUTPATIENT
Start: 2018-11-04 | End: 2018-11-03 | Stop reason: HOSPADM

## 2018-11-02 RX ORDER — PROPOFOL 10 MG/ML
INJECTION, EMULSION INTRAVENOUS AS NEEDED
Status: DISCONTINUED | OUTPATIENT
Start: 2018-11-02 | End: 2018-11-02 | Stop reason: HOSPADM

## 2018-11-02 RX ORDER — SODIUM CHLORIDE, SODIUM LACTATE, POTASSIUM CHLORIDE, CALCIUM CHLORIDE 600; 310; 30; 20 MG/100ML; MG/100ML; MG/100ML; MG/100ML
INJECTION, SOLUTION INTRAVENOUS
Status: DISCONTINUED | OUTPATIENT
Start: 2018-11-02 | End: 2018-11-02 | Stop reason: HOSPADM

## 2018-11-02 RX ORDER — FENTANYL CITRATE 50 UG/ML
50 INJECTION, SOLUTION INTRAMUSCULAR; INTRAVENOUS AS NEEDED
Status: DISCONTINUED | OUTPATIENT
Start: 2018-11-02 | End: 2018-11-02 | Stop reason: HOSPADM

## 2018-11-02 RX ORDER — WARFARIN SODIUM 5 MG/1
5 TABLET ORAL ONCE
Status: COMPLETED | OUTPATIENT
Start: 2018-11-02 | End: 2018-11-02

## 2018-11-02 RX ORDER — HYDROMORPHONE HYDROCHLORIDE 2 MG/ML
1 INJECTION, SOLUTION INTRAMUSCULAR; INTRAVENOUS; SUBCUTANEOUS
Status: DISCONTINUED | OUTPATIENT
Start: 2018-11-02 | End: 2018-11-03 | Stop reason: HOSPADM

## 2018-11-02 RX ORDER — NALOXONE HYDROCHLORIDE 0.4 MG/ML
0.4 INJECTION, SOLUTION INTRAMUSCULAR; INTRAVENOUS; SUBCUTANEOUS AS NEEDED
Status: DISCONTINUED | OUTPATIENT
Start: 2018-11-02 | End: 2018-11-03 | Stop reason: HOSPADM

## 2018-11-02 RX ORDER — PROPOFOL 10 MG/ML
INJECTION, EMULSION INTRAVENOUS
Status: DISCONTINUED | OUTPATIENT
Start: 2018-11-02 | End: 2018-11-02 | Stop reason: HOSPADM

## 2018-11-02 RX ORDER — EPHEDRINE SULFATE 50 MG/ML
5 INJECTION, SOLUTION INTRAVENOUS ONCE
Status: COMPLETED | OUTPATIENT
Start: 2018-11-02 | End: 2018-11-02

## 2018-11-02 RX ORDER — SODIUM CHLORIDE 0.9 % (FLUSH) 0.9 %
5-10 SYRINGE (ML) INJECTION EVERY 8 HOURS
Status: DISCONTINUED | OUTPATIENT
Start: 2018-11-03 | End: 2018-11-03 | Stop reason: HOSPADM

## 2018-11-02 RX ORDER — DEXMEDETOMIDINE HYDROCHLORIDE 4 UG/ML
INJECTION, SOLUTION INTRAVENOUS
Status: DISCONTINUED | OUTPATIENT
Start: 2018-11-02 | End: 2018-11-02 | Stop reason: HOSPADM

## 2018-11-02 RX ORDER — MIDAZOLAM HYDROCHLORIDE 1 MG/ML
0.5 INJECTION, SOLUTION INTRAMUSCULAR; INTRAVENOUS
Status: DISCONTINUED | OUTPATIENT
Start: 2018-11-02 | End: 2018-11-02 | Stop reason: HOSPADM

## 2018-11-02 RX ORDER — AMOXICILLIN 250 MG
1 CAPSULE ORAL 2 TIMES DAILY
Status: DISCONTINUED | OUTPATIENT
Start: 2018-11-02 | End: 2018-11-03 | Stop reason: HOSPADM

## 2018-11-02 RX ORDER — ONDANSETRON 2 MG/ML
4 INJECTION INTRAMUSCULAR; INTRAVENOUS AS NEEDED
Status: DISCONTINUED | OUTPATIENT
Start: 2018-11-02 | End: 2018-11-02 | Stop reason: HOSPADM

## 2018-11-02 RX ORDER — CELECOXIB 200 MG/1
200 CAPSULE ORAL ONCE
Status: COMPLETED | OUTPATIENT
Start: 2018-11-02 | End: 2018-11-02

## 2018-11-02 RX ADMIN — OXYCODONE HYDROCHLORIDE 5 MG: 5 TABLET ORAL at 23:03

## 2018-11-02 RX ADMIN — DOCUSATE SODIUM AND SENNOSIDES 1 TABLET: 8.6; 5 TABLET, FILM COATED ORAL at 20:37

## 2018-11-02 RX ADMIN — DEXMEDETOMIDINE HYDROCHLORIDE 8 MCG: 4 INJECTION, SOLUTION INTRAVENOUS at 10:19

## 2018-11-02 RX ADMIN — DEXMEDETOMIDINE HYDROCHLORIDE 8 MCG: 4 INJECTION, SOLUTION INTRAVENOUS at 10:10

## 2018-11-02 RX ADMIN — PROPOFOL 10 MG: 10 INJECTION, EMULSION INTRAVENOUS at 11:22

## 2018-11-02 RX ADMIN — KETAMINE HYDROCHLORIDE 10 MG: 10 INJECTION, SOLUTION INTRAMUSCULAR; INTRAVENOUS at 10:18

## 2018-11-02 RX ADMIN — EPHEDRINE SULFATE 25 MG: 50 INJECTION, SOLUTION INTRAVENOUS at 13:22

## 2018-11-02 RX ADMIN — CELECOXIB 200 MG: 200 CAPSULE ORAL at 08:54

## 2018-11-02 RX ADMIN — ROPIVACAINE HYDROCHLORIDE 30 ML: 5 INJECTION, SOLUTION EPIDURAL; INFILTRATION; PERINEURAL at 09:07

## 2018-11-02 RX ADMIN — Medication 120 MCG: at 12:22

## 2018-11-02 RX ADMIN — DEXMEDETOMIDINE HYDROCHLORIDE 4 MCG: 4 INJECTION, SOLUTION INTRAVENOUS at 10:24

## 2018-11-02 RX ADMIN — WARFARIN SODIUM 5 MG: 5 TABLET ORAL at 15:29

## 2018-11-02 RX ADMIN — EPHEDRINE SULFATE 15 MG: 50 INJECTION, SOLUTION INTRAVENOUS at 12:26

## 2018-11-02 RX ADMIN — ACETAMINOPHEN 1000 MG: 500 TABLET ORAL at 15:28

## 2018-11-02 RX ADMIN — SODIUM CHLORIDE, SODIUM LACTATE, POTASSIUM CHLORIDE, CALCIUM CHLORIDE: 600; 310; 30; 20 INJECTION, SOLUTION INTRAVENOUS at 10:09

## 2018-11-02 RX ADMIN — DEXMEDETOMIDINE HYDROCHLORIDE 0.5 MCG/KG/HR: 4 INJECTION, SOLUTION INTRAVENOUS at 10:20

## 2018-11-02 RX ADMIN — Medication 2 G: at 10:20

## 2018-11-02 RX ADMIN — Medication 80 MCG: at 12:20

## 2018-11-02 RX ADMIN — ACETAMINOPHEN 500 MG TABLET 1000 MG: at 08:54

## 2018-11-02 RX ADMIN — DEXAMETHASONE SODIUM PHOSPHATE 8 MG: 4 INJECTION, SOLUTION INTRA-ARTICULAR; INTRALESIONAL; INTRAMUSCULAR; INTRAVENOUS; SOFT TISSUE at 10:36

## 2018-11-02 RX ADMIN — DEXMEDETOMIDINE HYDROCHLORIDE 4 MCG: 4 INJECTION, SOLUTION INTRAVENOUS at 11:23

## 2018-11-02 RX ADMIN — BUPIVACAINE HYDROCHLORIDE 10 MG: 5 INJECTION, SOLUTION EPIDURAL; INTRACAUDAL at 10:25

## 2018-11-02 RX ADMIN — FENTANYL CITRATE 50 MCG: 50 INJECTION, SOLUTION INTRAMUSCULAR; INTRAVENOUS at 09:07

## 2018-11-02 RX ADMIN — KETAMINE HYDROCHLORIDE 20 MG: 10 INJECTION, SOLUTION INTRAMUSCULAR; INTRAVENOUS at 10:30

## 2018-11-02 RX ADMIN — PROPOFOL 10 MG: 10 INJECTION, EMULSION INTRAVENOUS at 10:18

## 2018-11-02 RX ADMIN — EPHEDRINE SULFATE 5 MG: 50 INJECTION, SOLUTION INTRAVENOUS at 13:24

## 2018-11-02 RX ADMIN — MIDAZOLAM HYDROCHLORIDE 2 MG: 1 INJECTION, SOLUTION INTRAMUSCULAR; INTRAVENOUS at 09:07

## 2018-11-02 RX ADMIN — MIDAZOLAM HYDROCHLORIDE 2 MG: 1 INJECTION, SOLUTION INTRAMUSCULAR; INTRAVENOUS at 10:10

## 2018-11-02 RX ADMIN — ONDANSETRON HYDROCHLORIDE 4 MG: 2 INJECTION, SOLUTION INTRAVENOUS at 23:24

## 2018-11-02 RX ADMIN — PROPOFOL 30 MCG/KG/MIN: 10 INJECTION, EMULSION INTRAVENOUS at 10:20

## 2018-11-02 RX ADMIN — SODIUM CHLORIDE 125 ML/HR: 900 INJECTION, SOLUTION INTRAVENOUS at 13:00

## 2018-11-02 RX ADMIN — Medication 2 G: at 18:21

## 2018-11-02 RX ADMIN — SODIUM CHLORIDE, SODIUM LACTATE, POTASSIUM CHLORIDE, CALCIUM CHLORIDE: 600; 310; 30; 20 INJECTION, SOLUTION INTRAVENOUS at 10:42

## 2018-11-02 RX ADMIN — CELECOXIB 200 MG: 200 CAPSULE ORAL at 20:37

## 2018-11-02 RX ADMIN — FAMOTIDINE 20 MG: 20 TABLET ORAL at 20:37

## 2018-11-02 RX ADMIN — ONDANSETRON 4 MG: 2 INJECTION INTRAMUSCULAR; INTRAVENOUS at 10:36

## 2018-11-02 RX ADMIN — MONTELUKAST SODIUM 10 MG: 10 TABLET, COATED ORAL at 23:03

## 2018-11-02 RX ADMIN — ACETAMINOPHEN 1000 MG: 500 TABLET ORAL at 20:37

## 2018-11-02 NOTE — PROGRESS NOTES
Physical Therapy: 
 
Chart reviewed. Attempted PT evaluation. Pt s/p R TKA POD #0. Pt surgical limb is numb and no muscle contraction. Will follow up tomorrow for evaluation. Thank you Mel Molina, PT, DPT

## 2018-11-02 NOTE — ANESTHESIA PROCEDURE NOTES
Peripheral Block Start time: 11/2/2018 9:00 AM 
End time: 11/2/2018 9:07 AM 
Performed by: Jose Alfredo Reynoso MD 
Authorized by: Jose Alfredo Reynoso MD  
 
 
Pre-procedure: Indications: at surgeon's request and post-op pain management Preanesthetic Checklist: patient identified, risks and benefits discussed, site marked, timeout performed, anesthesia consent given and patient being monitored Timeout Time: 09:00 Block Type:  
Block Type: Adductor canal 
Laterality:  Right Monitoring:  Standard ASA monitoring, continuous pulse ox, frequent vital sign checks, heart rate, responsive to questions and oxygen Injection Technique:  Single shot Procedures: ultrasound guided Patient Position: supine Prep: chlorhexidine Location:  Mid thigh Needle Type:  Stimuplex Needle Gauge:  22 G Needle Localization:  Ultrasound guidance Assessment: 
Number of attempts:  1 Injection Assessment:  Incremental injection every 5 mL, local visualized surrounding nerve on ultrasound, negative aspiration for blood, no paresthesia, no intravascular symptoms and negative aspiration for CSF Patient tolerance:  Patient tolerated the procedure well with no immediate complications

## 2018-11-02 NOTE — DISCHARGE INSTRUCTIONS
Discharge Instructions Knee Replacement  Dr. Evan Weinberg    Patient Name: Juliane Ponce  Date of procedure: 11/2/2018   Procedure: Procedure(s):  RIGHT TOTAL KNEE ARTHROPLASTY (GENERAL W/ BLOCK)  Surgeon: Brooklyn Griffiths) and Role:     Eufemia Hernandez MD (Jody) - Primary    PCP: Gina Cardenas MD  Date of discharge: No discharge date for patient encounter. Follow up appointments   Follow up with Dr. Evan Weinberg in 3-4 weeks. Call [phone] to make an appointment.  If home health has been arranged for you the agency will contact you to arrange dates/times for visits. Please call them if you do not hear from them within 24 hours after you are discharged    When to call your Orthopaedic Surgeon: Call [phone]. If you call after 5pm or on a weekend, the on call physician will be contacted   Unrelieved pain   Signs of infection-if your incision is red; continues to have drainage; drainage has a foul odor or if you have a persistent fever over 101 degrees   Signs of a blood clot in your leg-calf pain, tenderness, redness, swelling of lower leg    When to call your Primary Care Physician:   Concerns about medical conditions such as diabetes, high blood pressure, asthma, congestive heart failure   Call if blood sugars are elevated, persistent headache or dizziness, coughing or congestion, constipation or diarrhea, burning with urination, abnormal heart rate    When to call [phone] go to the nearest emergency room   Sudden onset of chest pain, shortness of breath, difficulty breathing    Activity   Weight bearing as tolerated with walker or crutches.  Refer to your patient handbook for instructions and pictures   Complete your Home Exercise Program daily as instructed by your therapist.  Refer to your patient handbook for instructions and pictures   Get up every one hour and walk (except at night when sleeping)   Do not drive or operate heavy machinery    Incision Care     The Aquacel (brown, waterproof) surgical dressing is to remain on your knee for 7 days. On the 7th day have someone gently peel the dressing off by carefully lifting the edge and stretching it slightly to break the adhesive seal and leave incision open to air. You may take a shower with the Aquacel dressing in place.  You {DO/NOT DO:[id number]} have staples in your knee incision; if present they will be removed by the Home Health staff in 14 days    Once the Aquacel is removed, you may get your incision wet but do not submerge your incision under water in a bath tub, hot tub or swimming pool for 6 weeks after surgery      Preventing blood clots    Take Coumadin as prescribed by Dr. Phoebe Miller for one month following surgery. Take each day at 10 am.    Wear elastic stockings (TEDS) for 4 weeks. You should remove them for approximately 1 hour daily for showering/sponge bathing    Pain management   Keep ice wrap in place except when walking; changing gel packs every 4 hours   Lie down and elevate your leg on pillows for about 30 minutes after walking to decrease swelling and pain   Do ankle pumps (10 repetitions) every hour while awake and get up frequently to walk   Take Tylenol 1000 mg every 6 hours for 2 weeks    Take narcotic pain pill as prescribed if needed. Take with food; avoid alcohol while taking pain medication.  Decrease the amount of narcotic pain medication as your pain lessens    Diet   Resume usual diet; drink plenty of fluids; eat foods high in fiber   Take over-the-counter stool softeners and laxatives to prevent constipation

## 2018-11-02 NOTE — PROGRESS NOTES
11/02/18 1300 Vital Signs Pulse (Heart Rate) (!) 56 Resp Rate 14  
BP (!) 89/59 Oxygen Therapy O2 Sat (%) 97 % Pulse via Oximetry 55 beats per minute SBP in 89's last few bps. Dr Magnus Osorio notified. IV bolus and ephedrine ordered. See MAR and flow sheet for f/u.

## 2018-11-02 NOTE — PROGRESS NOTES
Pharmacist Note  Warfarin Dosing Consult provided for this 61 y.o. male to manage warfarin for DVT s/p RTK INR Goal: 1.7- 2.2 Therapy Day: 1 Preop Dose: Smith- 5mg Drugs that may increase INR: None Drugs that may decrease INR: None Other current anticoagulants/ drugs that may increase bleeding risk: Enoxaparin and NSAID Risk factors: None Daily INR ordered: YES No results for input(s): HGB, INR, HGBEXT in the last 72 hours. No lab exists for component: INREXT Hgb, Hct & INR (1 Year) Recent Labs 10/15/18 
1640 04/10/18 
2510 HGB 15.0 14.3 HCT 43.3 42.6 INR 1.0  --   
 
 
Date               INR                 Dose 10/15  1.0  -- 
11/2  --  5 mg Assessment/ Plan: Will order warfarin 5 mg PO x 1 dose. Pharmacy will continue to monitor daily and adjust therapy as indicated.

## 2018-11-02 NOTE — OP NOTES
Total Knee Operative Report      Patient: Tiffany Brambila MRN: [id number]  SSN: [id number]    YOB: 1960  Age: 61 y.o. Sex: male      DATE OF SURGERY:  11/2/2018    Indications: This is a 61 yrs male who presents with  right knee DJD. The patient was admitted for surgery as conservative measures have failed. Date of Surgery: 11/2/2018     Preoperative Diagnosis:  DJD right knee    Postoperative Diagnosis: DJD right knee    Surgeon: Eufemia Porter MD (Jody)    Assistant: Andie Clinton PA-C    Assistant: Surgeon(s):  Eufemia Pacheco MD (Jody)    Anesthesia: General    Procedure: Procedure(s):  RIGHT TOTAL KNEE ARTHROPLASTY (GENERAL W/ BLOCK)      Procedure Details:  After the procedure had been explained to the patient including the risks, benefits and possible complications, Tiffany Brambila signed the informed consent. Tiffany Brambila was then brought to the operating room and positioned on the operating table in a supine position and was anethestized with anesthesia. A cardoso catheter was placed preoperatively and IV antibiotics was administered. A pneumatic tourniquet was placed about the limb and the right leg was prepped and draped in the usual sterile manner. The tourniquet was inflated. An anterior longitudinal incision was accomplished just medial to the tibial tubercle and extending approximal 6 centimeters proximal to the superior pole of the patella. A medial parapatellar capsular incision was performed. The medial capsular flap was elevated around to the insertion of the semimembranous tendon. The patella was everted and the knee flexed and externally rotated. The medial and lateral menisci were excised. The lateral half of the fat pad excised and the patella femoral ligament was released. The anterior cruciate ligament was resected and the posterior cruciate ligament was substituted.  The Knee was flexed to 90 degrees and an intramedullary canal entry hole was drilled just anterior to the PCL insertion on the femur. The intramedullary fauzia was inserted and the cutting guide used this to reference for a resection of 10mm off of the distal femur in approx 6 degrees of valgus. Using extramedullary instrumentation, the tibial cut was then accomplished with three degrees of posterior slope. Approxiamately 2 mm of bone was removed from the medial side of the tibia. The flexion and extension gaps were assessed. The sizing guide for the femoral component was then placed and a size 10 was chosen. The guide was set in  3 degrees external rotation to the epicondylar axis to create an equal flexion gap. The appropriate cutting blocks were then utilized to perform the anterior,  Posterior, and  Chamfer cuts  with appropriate lateral translation accomplished. The tibia was sized to a F component. The tibial base plate was pinned into place and stem site prepared. The femoral trial with the box guide was set for the posterior cruciate substituting prosthesis and these cuts were made also. Lug holes were drilled to accommodate the femoral component. A preliminary range of motion was accomplished with the above size trial components. A 12 millimeter polyethylene insert allowed the patient to obtain full extension as well as appropriate flexion. The patient's ligaments were stable in flexion and extension to medial and lateral stressing and the alignment was through the appropriate mechanical axis. The patella was then measured using the calipers and found to be 25 mm thick. Using the cutting guide, 9.5 mm were then resected to accommodate the size 32 patella three peg button. The appropriate guide was then used to drill the three pegs. All trial components were removed and the cut surfaces prepared for cementing with irrigation and debridement of the bone interstices. There were no femoral deficiencies. There were no tibial deficiencies.   No augmentation was utilized. Two package of cement were mixed and the permanent components cemented into place. The femoral and tibial components were pressurized in full extension as well as 70 degrees of flexion. The patella component was pressurized using the patella clamp. Excess cement was using a curette. A combination of Exparel, MSO4, Marcaine, Steroid and Saline was then injected into the entirety of the capsule to assist with post-op pain relief. Once the cement was hardened, the patella clamp was removed and the knee was copiously irrigated. Retrialing was done and a size 10EF tibial polyethylene component  of 12 mm thickness was chosen. Ligia Sinha knee was placed through range of motion and noted to be stable as mentioned above with the trail components. The wound was dry, therefore no drain was used. The capsular layer was closed using a #1 ethibond suture and stratafix suture, while subcutaneous layers were closed using 2-0 Dexon interrupted sutures. Finally the skin was closed using Skin staples, which were applied in occlusive fashion and sterile bandage applied. An Iceman cryo pad was applied on the operative leg. The pneumatic tourniquet was deflated. Sponge count and needle counts were correct. Ligia Sinha left the operating room and went to the PACU in Stable Condition. Lisa Roche PA-C was of vital assistance during the performance of the procedure. Tourniquet Time:   Total Tourniquet Time Documented:  Thigh (Right) - 86 minutes  Total: Thigh (Right) - 86 minutes       Implants:   Implant Name Type Inv.  Item Serial No.  Lot No. LRB No. Used Action   CEMENT BNE MV SMARTSET 40GM --  - SNA  CEMENT BNE MV SMARTSET 40GM --  NA [facility] [employer] [id number] Right 2 Implanted   TIB PRN NP STM 5 DEG SZ FR -- PERSONA - SNA  TIB PRN NP STM 5 DEG SZ FR -- PERSONA NA [employer] [id number] Right 1 Implanted   INSERT ALL POLY PAT PLY 32MM -- PERSONA - SNA  INSERT ALL POLY PAT PLY 32MM -- PERSONA NA [employer] [id number] Right 1 Implanted   FEM PS ALESSANDRA CCR STD SZ 10 RT -- PERSONA - SNA  FEM PS ALESSANDRA CCR STD SZ 10 RT -- PERSONA NA [employer] [id number] Right 1 Implanted   INSERT ASF PS 12MM R 10-11 EF -- PERSONA VE - SNA  INSERT ASF PS 12MM R 10-11 EF -- PERSONA VE NA [employer] [id number] Right 1 Implanted        Femur Size: 10    Tibia Size: F    Condition: Stable    Findings: DJD    Estimated Blood Loss:    100         Drains: None    Specimens: * No specimens in log *      Complications:  None; patient tolerated the procedure well    Signed By: Eufemia Castro MD (11/2/2018 at 2:25 PM)

## 2018-11-02 NOTE — ANESTHESIA POSTPROCEDURE EVALUATION
Post-Anesthesia Evaluation and Assessment Patient: Guille Navarro MRN: [id number]  SSN: [id number] YOB: 1960  Age: 61 y.o. Sex: male I have evaluated the patient and they are stable and ready for discharge from the PACU. Cardiovascular Function/Vital Signs Visit Vitals BP (!) 88/60 Pulse (!) 57 Temp 36.3 °C (97.3 °F) Resp 16 Ht 6' 1\" (1.854 m) Wt 88.9 kg (196 lb) SpO2 97% BMI 25.86 kg/m² Patient is status post General anesthesia for Procedure(s): RIGHT TOTAL KNEE ARTHROPLASTY (GENERAL W/ BLOCK). Nausea/Vomiting: None Postoperative hydration reviewed and adequate. Pain: 
Pain Scale 1: Numeric (0 - 10) (11/02/18 1240) Pain Intensity 1: 0 (11/02/18 1240) Managed Neurological Status:  
Neuro (WDL): Exceptions to WDL (11/02/18 1230) Neuro Neurologic State: Anesthetized; Eyes open to stimulus (11/02/18 1230) LUE Motor Response: Purposeful (11/02/18 1235) LLE Motor Response: Pharmacologically paralyzed (11/02/18 1235) RUE Motor Response: Purposeful (11/02/18 1235) RLE Motor Response: Pharmacologically paralyzed (11/02/18 1235) At baseline Mental Status, Level of Consciousness: Alert and  oriented to person, place, and time Pulmonary Status:  
O2 Device: Nasal cannula (11/02/18 1230) Adequate oxygenation and airway patent Complications related to anesthesia: None Post-anesthesia assessment completed. No concerns Signed By: Aaron Gar MD   
 November 2, 2018 Procedure(s): RIGHT TOTAL KNEE ARTHROPLASTY (GENERAL W/ BLOCK). <BSHSIANPOST> Visit Vitals BP (!) 88/60 Pulse (!) 57 Temp 36.3 °C (97.3 °F) Resp 16 Ht 6' 1\" (1.854 m) Wt 88.9 kg (196 lb) SpO2 97% BMI 25.86 kg/m²

## 2018-11-02 NOTE — ANESTHESIA PROCEDURE NOTES
Spinal Block Start time: 11/2/2018 10:21 AM 
End time: 11/2/2018 10:25 AM 
Performed by: Lis Martínez MD 
Authorized by: Lis Martínez MD  
 
Pre-procedure: Indications: primary anesthetic  Preanesthetic Checklist: patient identified, risks and benefits discussed, anesthesia consent, site marked, patient being monitored and timeout performed Timeout Time: 10:21 Spinal Block:  
Patient Position:  Seated Prep Region:  Lumbar Prep: Betadine and patient draped Location:  L3-4 Technique:  Single shot Needle:  
Needle Type:  Pencil-tip Needle Gauge:  25 G Attempts:  1 Events: CSF confirmed, no blood with aspiration and no paresthesia Assessment: 
Insertion:  Uncomplicated Patient tolerance:  Patient tolerated the procedure well with no immediate complications

## 2018-11-02 NOTE — H&P
Knee Replacement Medical Necessity HISTORY Oseas Cox is a 61y.o. year old male who is suffering from:  
severe/end stage osteoarthritis 
 of His  Right knee with progressive worsening of symptoms and a functional decline over the past 2 Years. Treatment has included:   
prescription and OTC NSAIDS which have not effectively relieved pain/inflammation, physical therapy without functional improvement, intra-articular injections, activity modification and braces His knee pain and stiffness limit His ability to:   
perform ADL's, walk short distances without stopping to rest and sleep at night PHYSICAL EXAM 
Vital Signs Degrees of Deformity- Genu Varum 6 with abductor thrust 
ROM- 3 Degree of flexion contracture Crepitus- medial joint line Effusions-small Tenderness-medial joint line Gait-moderate antalgic INVESTIGATIONS X-ray AP and Lateral Right knee shows:  
joint space narrowing  medial, subchondral sclerosis and marginal osteophytes IMPRESSION Following a discussion of Oseas Cox x-rays, physical exam findings and history Oseas Cox is indicated for right total knee arthroplasty.

## 2018-11-02 NOTE — PROGRESS NOTES
Primary Nurse Vadim Rivera and Dena Langford, EMMANUEL performed a dual skin assessment on this patient Impairment noted- see wound doc flow sheet Miguel Angel score is 21 Bedside shift change report given to Edison Castaneda (oncoming nurse) by Alix Serrano (offgoing nurse). Report included the following information SBAR, Kardex, Procedure Summary, Intake/Output and MAR.

## 2018-11-02 NOTE — ANESTHESIA PREPROCEDURE EVALUATION
Anesthetic History PONV Review of Systems / Medical History Patient summary reviewed, nursing notes reviewed and pertinent labs reviewed Pulmonary Within defined limits Neuro/Psych Within defined limits Cardiovascular Within defined limits GI/Hepatic/Renal 
Within defined limits Endo/Other Arthritis Other Findings Physical Exam 
 
Airway Mallampati: II 
TM Distance: > 6 cm Neck ROM: normal range of motion Mouth opening: Normal 
 
 Cardiovascular Regular rate and rhythm,  S1 and S2 normal,  no murmur, click, rub, or gallop Dental 
 
Dentition: Upper dentition intact and Lower dentition intact Pulmonary Breath sounds clear to auscultation Abdominal 
GI exam deferred Other Findings Anesthetic Plan ASA: 2 Anesthesia type: spinal 
 
 
Post-op pain plan if not by surgeon: peripheral nerve block single Induction: Intravenous Anesthetic plan and risks discussed with: Patient

## 2018-11-02 NOTE — PERIOP NOTES
TRANSFER - OUT REPORT: 
 
Verbal report given to [facility] on Timur Danielle  being transferred to room 553 for routine post - op Report consisted of patients Situation, Background, Assessment and  
Recommendations(SBAR). Time Pre op antibiotic given:  3996 Anesthesia Stop time:  1231 Reinoso Present on Transfer to floor: YES Order for Reinoso on Chart:  YES Information from the following report(s) SBAR and MAR was reviewed with the receiving nurse. Opportunity for questions and clarification was provided. Is the patient on 02? YES 
     L/Min 2 Other Is the patient on a monitor? NO Is the nurse transporting with the patient? NO Surgical Waiting Area notified of patient's transfer from PACU? YES Lines: The following personal items collected during your admission accompanied patient upon transfer:  
Dental Appliance: Dental Appliances: None Vision:   
Hearing Aid:   
Jewelry:   
Clothing: Clothing: Other (comment)(clothing bag to [employer] [place] [employer]) Other Valuables:   
Valuables sent to safe:

## 2018-11-03 VITALS
OXYGEN SATURATION: 99 % | HEIGHT: 73 IN | HEART RATE: 77 BPM | SYSTOLIC BLOOD PRESSURE: 111 MMHG | RESPIRATION RATE: 18 BRPM | BODY MASS INDEX: 25.98 KG/M2 | DIASTOLIC BLOOD PRESSURE: 74 MMHG | TEMPERATURE: 98 F | WEIGHT: 196 LBS

## 2018-11-03 LAB
ANION GAP SERPL CALC-SCNC: 9 MMOL/L (ref 5–15)
BUN SERPL-MCNC: 13 MG/DL (ref 6–20)
BUN/CREAT SERPL: 18 (ref 12–20)
CALCIUM SERPL-MCNC: 8 MG/DL (ref 8.5–10.1)
CHLORIDE SERPL-SCNC: 109 MMOL/L (ref 97–108)
CO2 SERPL-SCNC: 21 MMOL/L (ref 21–32)
CREAT SERPL-MCNC: 0.74 MG/DL (ref 0.7–1.3)
GLUCOSE SERPL-MCNC: 141 MG/DL (ref 65–100)
HGB BLD-MCNC: 11.7 G/DL (ref 12.1–17)
INR PPP: 1 (ref 0.9–1.1)
POTASSIUM SERPL-SCNC: 4 MMOL/L (ref 3.5–5.1)
PROTHROMBIN TIME: 10.3 SEC (ref 9–11.1)
SODIUM SERPL-SCNC: 139 MMOL/L (ref 136–145)

## 2018-11-03 PROCEDURE — G8988 SELF CARE GOAL STATUS: HCPCS

## 2018-11-03 PROCEDURE — G8989 SELF CARE D/C STATUS: HCPCS

## 2018-11-03 PROCEDURE — 85610 PROTHROMBIN TIME: CPT | Performed by: ORTHOPAEDIC SURGERY

## 2018-11-03 PROCEDURE — 97535 SELF CARE MNGMENT TRAINING: CPT

## 2018-11-03 PROCEDURE — G8987 SELF CARE CURRENT STATUS: HCPCS

## 2018-11-03 PROCEDURE — 97110 THERAPEUTIC EXERCISES: CPT

## 2018-11-03 PROCEDURE — 36415 COLL VENOUS BLD VENIPUNCTURE: CPT | Performed by: ORTHOPAEDIC SURGERY

## 2018-11-03 PROCEDURE — 97165 OT EVAL LOW COMPLEX 30 MIN: CPT

## 2018-11-03 PROCEDURE — 97116 GAIT TRAINING THERAPY: CPT

## 2018-11-03 PROCEDURE — 97530 THERAPEUTIC ACTIVITIES: CPT

## 2018-11-03 PROCEDURE — 80048 BASIC METABOLIC PNL TOTAL CA: CPT | Performed by: ORTHOPAEDIC SURGERY

## 2018-11-03 PROCEDURE — 97162 PT EVAL MOD COMPLEX 30 MIN: CPT

## 2018-11-03 PROCEDURE — 74011250637 HC RX REV CODE- 250/637: Performed by: ORTHOPAEDIC SURGERY

## 2018-11-03 PROCEDURE — 74011250636 HC RX REV CODE- 250/636: Performed by: ORTHOPAEDIC SURGERY

## 2018-11-03 PROCEDURE — 74011000250 HC RX REV CODE- 250: Performed by: ORTHOPAEDIC SURGERY

## 2018-11-03 PROCEDURE — 85018 HEMOGLOBIN: CPT | Performed by: ORTHOPAEDIC SURGERY

## 2018-11-03 RX ORDER — OXYCODONE HYDROCHLORIDE 5 MG/1
5 TABLET ORAL
Qty: 42 TAB | Refills: 0 | Status: SHIPPED | OUTPATIENT
Start: 2018-11-03 | End: 2020-05-07

## 2018-11-03 RX ORDER — WARFARIN SODIUM 5 MG/1
5 TABLET ORAL DAILY
Qty: 40 TAB | Refills: 1 | Status: SHIPPED | OUTPATIENT
Start: 2018-11-03 | End: 2020-05-07

## 2018-11-03 RX ADMIN — POLYETHYLENE GLYCOL 3350 17 G: 17 POWDER, FOR SOLUTION ORAL at 08:49

## 2018-11-03 RX ADMIN — DOCUSATE SODIUM AND SENNOSIDES 1 TABLET: 8.6; 5 TABLET, FILM COATED ORAL at 08:49

## 2018-11-03 RX ADMIN — OXYCODONE HYDROCHLORIDE 5 MG: 5 TABLET ORAL at 08:49

## 2018-11-03 RX ADMIN — WARFARIN SODIUM 6 MG: 1 TABLET ORAL at 11:16

## 2018-11-03 RX ADMIN — Medication 10 ML: at 14:00

## 2018-11-03 RX ADMIN — ACETAMINOPHEN 1000 MG: 500 TABLET ORAL at 08:49

## 2018-11-03 RX ADMIN — OXYCODONE HYDROCHLORIDE 10 MG: 5 TABLET ORAL at 11:16

## 2018-11-03 RX ADMIN — FAMOTIDINE 20 MG: 20 TABLET ORAL at 08:49

## 2018-11-03 RX ADMIN — ENOXAPARIN SODIUM 40 MG: 40 INJECTION SUBCUTANEOUS at 08:49

## 2018-11-03 RX ADMIN — OXYCODONE HYDROCHLORIDE 10 MG: 5 TABLET ORAL at 14:53

## 2018-11-03 RX ADMIN — CELECOXIB 200 MG: 200 CAPSULE ORAL at 08:49

## 2018-11-03 RX ADMIN — Medication 2 G: at 02:19

## 2018-11-03 NOTE — PROGRESS NOTES
Problem: Mobility Impaired (Adult and Pediatric) Goal: *Acute Goals and Plan of Care (Insert Text) Physical Therapy Goals Initiated 11/3/2018 1. Patient will move from supine to sit and sit to supine  in bed with modified independence within 4 days. 2. Patient will perform sit to stand with modified independence within 4 days. 3. Patient will ambulate with supervision/set-up for 250 feet with the least restrictive device within 4 days. 4. Patient will ascend/descend 4 stairs with one handrail(s) with minimal assistance/contact guard assist within 4 days. 5. Patient will perform home exercise program per protocol with modified independence within 4 days. 6. Patient will demonstrate AROM 0-90 degrees in operative joint within 4 days. physical Therapy TREATMENT Patient: Karla Mondragon (77 y.o. male) Date: 11/3/2018 Diagnosis: RIGHT KNEE OSTEOARTHRITIS Osteoarthritis of right knee Post-traumatic osteoarthritis of right knee Procedure(s) (LRB): 
RIGHT TOTAL KNEE ARTHROPLASTY (GENERAL W/ BLOCK) (Right) 1 Day Post-Op Precautions: Fall Chart, physical therapy assessment, plan of care and goals were reviewed. ASSESSMENT: 
Patient was received with OT finishing session. He was agreeable to participate with PT. He has several stairs at home, and thus needed stair training. He has bilateral hand rails on all stairs. He was able to ambulate to PT gym and negotiate stairs safely with good technique. He was able to walk back to room and use restroom independently. He is clear from a PT standpoint and [facility] care discharge. Progression toward goals: 
[x]      Improving appropriately and progressing toward goals 
[]      Improving slowly and progressing toward goals 
[]      Not making progress toward goals and plan of care will be adjusted PLAN: 
Patient continues to benefit from skilled intervention to address the above impairments. Continue treatment per established plan of care. Discharge Recommendations:  Home Health Further Equipment Recommendations for Discharge:  rolling walker SUBJECTIVE:  
Patient stated I feel ok, just really tired this afternoon.  OBJECTIVE DATA SUMMARY:  
Critical Behavior: 
Neurologic State: Alert, Appropriate for age Orientation Level: Oriented X4 Cognition: Appropriate decision making, Appropriate for age attention/concentration, Appropriate safety awareness Safety/Judgement: Awareness of environment Range of Motion: 
AROM: Generally decreased, functional 
PROM: Generally decreased, functional 
RLE Assessment (WDL): Exceptions to WDL 
RLE AROM 
R Knee Flexion: 85 
R Knee Extension: 2 Functional Mobility Training: 
Bed Mobility: 
  
Supine to Sit: Contact guard assistance Sit to Supine: Contact guard assistance Scooting: Stand-by assistance Transfers: 
Sit to Stand: Contact guard assistance Stand to Sit: Contact guard assistance Balance: 
Sitting: Intact Standing: Intact Ambulation/Gait Training: 
Distance (ft): 250 Feet (ft) Assistive Device: Gait belt;Walker, rolling Ambulation - Level of Assistance: Contact guard assistance Gait Description (WDL): Exceptions to [facility] Gait Abnormalities: Decreased step clearance Speed/Maira: Pace decreased (<100 feet/min) Step Length: Right shortened;Left shortened Swing Pattern: Right symmetrical 
  
  
  
Therapeutic Exercises:  
 
EXERCISE Sets Reps Active Active Assist  
Passive Self ROM Comments Ankle Pumps   [x]                                        []                                        []                                        []                                          
Quad Sets   [x]                                        []                                        []                                        []                                          
 Hamstring Sets   []                                        []                                        []                                        []                                          
Short Arc Quads   []                                        []                                        []                                        []                                          
Knee Extension Stretch    
[]                                         
Heel Slides   [x]                                        []                                        []                                        []                                          
Long Arc Quads   []                                        []                                        []                                        []                                          
Knee Flexion Stretch   []                                        []                                        []                                        []                                          
Straight Leg Raises   []                                        []                                        []                                        []                                          
 
Pain: 
Pain Scale 1: Numeric (0 - 10) Pain Intensity 1: 7 Pain Location 1: Knee Pain Orientation 1: Right Pain Description 1: Aching Pain Intervention(s) 1: Medication (see MAR) Activity Tolerance:  
Good Please refer to the flowsheet for vital signs taken during this treatment. After treatment:  
[] Patient left in no apparent distress sitting up in chair 
[x] Patient left in no apparent distress in bed 
[x] Call bell left within reach [x] Nursing notified 
[x] Caregiver present 
[] Bed alarm activated COMMUNICATION/COLLABORATION:  
 The patients plan of care was discussed with: Registered Nurse Becka Torres, PT Time Calculation: 25 mins

## 2018-11-03 NOTE — PROGRESS NOTES
Bedside shift change report given to Martina Resendiz (oncoming nurse) by Mehrdad Laughlin (offgoing nurse). Report included the following information SBAR, Kardex, Intake/Output, MAR and Recent Results.

## 2018-11-03 NOTE — PROGRESS NOTES
Occupational Therapy EVALUATION/discharge Patient: Radha Hall (11 y.o. male) Date: 11/3/2018 Primary Diagnosis: RIGHT KNEE OSTEOARTHRITIS Osteoarthritis of right knee Procedure(s) (LRB): 
RIGHT TOTAL KNEE ARTHROPLASTY (GENERAL W/ BLOCK) (Right) 1 Day Post-Op Precautions: Fall, WBAT RLE 
 
ASSESSMENT:  
Based on the objective data described below, the patient presents with IND-CGA for upper body ADLs, CGA-Min A for lower body ADLs, and CGA for functional mobility s/p R TKA POD #1. PTA, patient IND, living with spouse in a 2 story house, hx of previous spinal, UE, & knee surgeries. Now presents close to his baseline, slightly limited with distal lower body dressing d/t post-op pain & decreased activity tolerance. Ambulation in room & bathroom completed with RW & SBA with cues for no rotation of the knee with good carryover. Requires increased time & cues for pacing with all activities, wife is very supportive (jumping in to assist with lower body dressing & bed mobility), will assist PRN at home. Educated on home safety, energy conservation, ADL modifications, tub transfers, handouts provided with recommendation to obtain/borrow a shower chair. Patient returned to supine with CGA, PT coming in for stair training. No further acute OT needs, safe to d/c home with wife's support. Further skilled acute occupational therapy is not indicated at this time. Discharge Recommendations: None Further Equipment Recommendations for Discharge: shower chair SUBJECTIVE:  
Patient stated I can do it, I'm going to get this shoe on but man I'm not taking it off.  OBJECTIVE DATA SUMMARY:  
HISTORY:  
Past Medical History:  
Diagnosis Date  Arthritis  Chronic pain  Environmental and seasonal allergies  LBBB (left bundle branch block) 10/16/2018  Nausea & vomiting Past Surgical History:  
Procedure Laterality Date  HX CERVICAL FUSION  2006  HX GI    
 COLONOSCOPY Justyn Blevins [id number]  HX ORTHOPAEDIC Left 2009 HAND SURGERY   
 [facility] Prior Level of Function/Environment/Context: PTA, IND, living with wife in a 2 story house with 13 steps to main bed/bath. Able to stay on low couch on 1st floor if needed. Has a reacher, RW, SPC, & LHBS, wife will be around at all times to assist PRN. 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 Hx of multiple sxs (spine x2, hand, knee). Home Situation Home Environment: Private residence # Steps to Enter: 3 Rails to Enter: Yes Hand Rails : Bilateral 
One/Two Story Residence: Two story # of Interior Steps: 15 Interior Rails: Right Lift Chair Available: No 
Living Alone: No 
Support Systems: Spouse/Significant Other/Partner Patient Expects to be Discharged to[de-identified] Private residence Current DME Used/Available at Home: Walker, rolling, Cane, straight, Grab bars, Safety frame toliet(reacher, long handled shoe horn) Tub or Shower Type: Tub/Shower combination Hand dominance: Right EXAMINATION OF PERFORMANCE DEFICITS: 
Cognitive/Behavioral Status: 
Neurologic State: Alert Orientation Level: Oriented X4 Cognition: Appropriate for age attention/concentration; Appropriate decision making; Appropriate safety awareness; Follows commands Perception: Appears intact Perseveration: No perseveration noted Safety/Judgement: Awareness of environment; Fall prevention;Good awareness of safety precautions Skin: Appears intact, R knee bandage Edema: none noted in BUEs Hearing: Auditory Auditory Impairment: None Vision/Perceptual:   
Tracking: Able to track stimulus in all quadrants w/o difficulty Acuity: Impaired near vision Corrective Lenses: Reading glasses Range of Motion: In [address] AROM: Within functional limits PROM: Generally decreased, functional 
  
  
  
  
  
  
Strength: In [address] Strength: Within functional limits Coordination: 
Coordination: Within functional limits Fine Motor Skills-Upper: Left Intact; Right Intact Gross Motor Skills-Upper: Left Intact; Right Intact Tone & Sensation: In [address] Tone: Normal 
Sensation: Intact Balance: 
Sitting: Intact Standing: Intact; With support(RW) Functional Mobility and Transfers for ADLs:Bed Mobility: 
Supine to Sit: Contact guard assistance(wife providing CGA with RLE) Sit to Supine: Contact guard assistance(RLE) Scooting: Stand-by assistance Transfers: 
Sit to Stand: Contact guard assistance Stand to Sit: Contact guard assistance Bathroom Mobility: Contact guard assistance Toilet Transfer : Contact guard assistance Tub Transfer: (discussed technique for getting in/out) ADL Assessment: 
Feeding: Independent Oral Facial Hygiene/Grooming: Contact guard assistance Bathing: Stand-by assistance Upper Body Dressing: Independent Lower Body Dressing: Minimum assistance Toileting: Supervision ADL Intervention and task modifications: 
  
 
  
 
  
 
Lower Body Bathing Perineal  : Compensatory technique training Lower Body : Compensatory technique training Cues: Verbal cues provided;Visual cues provided(handout) Adaptive Equipment: (recommended shower chair for safety & EC) Upper Body Dressing Assistance Dressing Assistance: Independent(seated EOB) Pullover Shirt: Independent Lower Body Dressing Assistance Dressing Assistance: Minimum assistance Underpants: Contact guard assistance; Compensatory technique training(CGA for standing, ed on reacher use PRN) Pants With Elastic Waist: Minimum assistance; Compensatory technique training(for distal reach to feet with long pants) Pants With Button/Zipper: Compensatory technique training Socks: Stand-by assistance; Compensatory technique training(increased time, cues for pacing & technique) Slip on Shoes with Back: Minimum assistance; Compensatory technique training(cues for pacing, tecnhique & no rotation of knee) Position Performed: Seated edge of bed Cues: Verbal cues provided;Visual cues provided;Physical assistance Adaptive Equipment Used: (educated on reacher & long handled shoe horn use PRN) Toileting Toileting Assistance: Contact guard assistance Bladder Hygiene: Supervision/set-up Bowel Hygiene: Compensatory technique training Clothing Management: Compensatory technique training Cues: Visual cues provided;Verbal cues provided Adaptive Equipment: Grab bars; Silvano Ashton Cognitive Retraining Safety/Judgement: Awareness of environment; Fall prevention;Good awareness of safety precautions Bathing: Patient instructed and indicated understanding when bathing to not submerge wound in water, stand to shower or sponge bathe, cover wound with plastic and tape to ensure no water reaches bandage/wound without cues. Dressing joint: Patient instructed and demonstrated understanding to don/doff Right LE first/last with CGA-Minimum assistance. Patient instructed and demonstrated to don all clothing while sitting prior to standing, doff all clothing to knees while standing, then sit to doff clothing off from knees to feet in order to facilitate fall prevention, pain management, and energy conservation with Supervision. Dressing joint reach exercise: To increase independence with lower body dressing, patient instructed and demonstrated to reach down Right LE in a seated position slowly to prevent tearing/shearing until slight pull is felt, hold at end range for 10 seconds, then return to starting upright position with Supervision. Patient instructed to complete three sets of three repetitions each daily.   
Home safety: Patient instructed and indicated understanding on home modifications and safety (raise height of ADL objects, appropriate height of chair surfaces, recliner safety, change of floor surfaces, clear pathways) to increase independence and fall prevention. Standing: Patient instructed and demonstrated during ADLs to walk up to sink/counter top/surfaces, step into walker to increase safety of joint and fall prevention with Contact guard assistance. Patient educated about knee anatomy verbally and with pictures and educated to avoid rotation of Right LE. Instructed to apply concept to ADLs within the home (no twisting of knee during reaching across body, square off while using objects, slide objects along surfaces). Patient instructed and indicated understanding to increase amount of time standing, observe standing position during ADLs in order to increase even weight bearing through bilateral LEs in order to increase independence with ADLs. Goal to be reached 30 days post - op, per orthopedic surgeon or per PT. Tub/shower transfer: Patient instructed and indicated understanding regarding when it is safe to begin transfer into tub/shower (complete stairs with PT, advance exercises with PT high enough to clear tub/shower height). Patient instructed to use the same technique as used with stairs when entering and exiting tub/shower (\"up with the non-surgical, down with the surgical leg\"). Therapeutic Exercise: 
Ambulation to/from bathroom with CGA & RW, cues for RLE positioning Functional Measure: 
Barthel Index: 
 
Bathin Bladder: 10 Bowels: 10 
Groomin Dressin Feeding: 10 Mobility: 10 Stairs: 5 Toilet Use: 10 Transfer (Bed to Chair and Back): 10 Total: 80 Barthel and G-code impairment scale: 
Percentage of impairment CH 
0% CI 
1-19% CJ 
20-39% CK 
40-59% CL 
60-79% CM 
80-99% CN 
100% Barthel Score 0-100 100 99-80 79-60 59-40 20-39 1-19 
 0 Barthel Score 0-20 20 17-19 13-16 9-12 5-8 1-4 0 The Barthel ADL Index: Guidelines 1. The index should be used as a record of what a patient does, not as a record of what a patient could do. 2. The main aim is to establish degree of independence from any help, physical or verbal, however minor and for whatever reason. 3. The need for supervision renders the patient not independent. 4. A patient's performance should be established using the best available evidence. Asking the patient, friends/relatives and nurses are the usual sources, but direct observation and common sense are also important. However direct testing is not needed. 5. Usually the patient's performance over the preceding 24-48 hours is important, but occasionally longer periods will be relevant. 6. Middle categories imply that the patient supplies over 50 per cent of the effort. 7. Use of aids to be independent is allowed. April Charles., Barthel, D.W. (5967). Functional evaluation: the Barthel Index. [facility] (14)2. Tamia Bolden dagoberto ADELINE Griggs, Tali Isabel., [place], [address] [place] [address] (1999). Measuring the change indisability after inpatient rehabilitation; comparison of the responsiveness of the Barthel Index and Functional [place] Measure. Journal of Neurology, Neurosurgery, and Psychiatry, 66(4), 845-357. Oleg Macias, N.J.A, CATIE Norris, & Ag Boyd, M.A. (2004.) Assessment of post-stroke quality of life in cost-effectiveness studies: The usefulness of the Barthel Index and the EuroQoL-5D. [facility], 13, 191-48 G codes: In compliance with CMSs Claims Based Outcome Reporting, the following G-code set was chosen for this patient based on their primary functional limitation being treated: The outcome measure chosen to determine the severity of the functional limitation was the Barthel Index with a score of 80/100 which was correlated with the impairment scale. ? Self Care:  
  - CURRENT STATUS: CI - 1%-19% impaired, limited or restricted  - GOAL STATUS: CI - 1%-19% impaired, limited or restricted  - D/C STATUS:  CI - 1%-19% impaired, limited or restricted Occupational Therapy Evaluation Charge Determination History Examination Decision-Making LOW Complexity : Brief history review  LOW Complexity : 1-3 performance deficits relating to physical, cognitive , or psychosocial skils that result in activity limitations and / or participation restrictions  LOW Complexity : No comorbidities that affect functional and no verbal or physical assistance needed to complete eval tasks Based on the above components, the patient evaluation is determined to be of the following complexity level: LOW Pain: 
Pain Scale 1: Numeric (0 - 10) Pain Intensity 1: 7 Pain Location 1: Knee Pain Orientation 1: Right Pain Description 1: Aching Pain Intervention(s) 1: Medication (see MAR) Activity Tolerance:  
Good Please refer to the flowsheet for vital signs taken during this treatment. After treatment:  
[]  Patient left in no apparent distress sitting up in chair 
[x]  Patient left in no apparent distress in bed 
[x]  Call bell left within reach [x]  Nursing notified 
[x]  Caregiver & PT present 
[]  Bed alarm activated COMMUNICATION/EDUCATION:  
Communication/Collaboration: 
[x]      Home safety education was provided and the patient/caregiver indicated understanding. [x]      Patient/family have participated as able and agree with findings and recommendations. []      Patient is unable to participate in plan of care at this time. Findings and recommendations were discussed with: Physical Therapist and Registered Nurse Alex Sandy OT Time Calculation: 50 mins

## 2018-11-03 NOTE — PROGRESS NOTES
PT clearance per Bna Fess set up per CRM, DC order per [facility] MD 
 
I have reviewed discharge instructions with the patient. The patient verbalized understanding. All belongings packed and sent to DC; phone wallet keys glasses prescriptions (Oxycodone + Coumadin), instructions. IV removed.

## 2018-11-03 NOTE — PROGRESS NOTES
Bedside shift change report given to Demond RN (oncoming nurse) by Zakia Husain RN (offgoing nurse). Report included the following information SBAR.

## 2018-11-03 NOTE — PROGRESS NOTES
Pharmacist Note  Warfarin Dosing Consult provided for this 61 y.o. male to manage warfarin for VTE ppx s/p RTK INR Goal: 2 per MD consult placed Therapy Day: 3 Preop Dose: Smith- 5mg Drugs that may increase INR: None Drugs that may decrease INR: None Other current anticoagulants/ drugs that may increase bleeding risk: Enoxaparin and NSAID Risk factors: None Daily INR ordered: YES Recent Labs 11/03/18 
0344 HGB 11.7* INR 1.0 Hgb, Hct & INR (1 Year) Recent Labs 10/15/18 
8314 04/10/18 
7981 HGB 15.0 14.3 HCT 43.3 42.6 INR 1.0  --   
 
 
Date               INR                 Dose 10/15  1.0  -- 
 
11/1                 --                    5 mg pre-op dose 11/2  --            5 mg 
11/3                 1.0                  6 mg Assessment/ Plan: Will order warfarin 6 mg PO x 1 dose. Pharmacy will continue to monitor daily and adjust therapy as indicated.   
 
 
Dipika Cohen, PharmD, BCPS

## 2018-11-03 NOTE — PROGRESS NOTES
Problem: Mobility Impaired (Adult and Pediatric) Goal: *Acute Goals and Plan of Care (Insert Text) Physical Therapy Goals Initiated 11/3/2018 1. Patient will move from supine to sit and sit to supine  in bed with modified independence within 4 days. 2. Patient will perform sit to stand with modified independence within 4 days. 3. Patient will ambulate with supervision/set-up for 250 feet with the least restrictive device within 4 days. 4. Patient will ascend/descend 4 stairs with one handrail(s) with minimal assistance/contact guard assist within 4 days. 5. Patient will perform home exercise program per protocol with modified independence within 4 days. 6. Patient will demonstrate AROM 0-90 degrees in operative joint within 4 days. physical Therapy knee EVALUATION Patient: Mayur Escoto (27 y.o. male) Date: 11/3/2018 Primary Diagnosis: RIGHT KNEE OSTEOARTHRITIS Osteoarthritis of right knee Procedure(s) (LRB): 
RIGHT TOTAL KNEE ARTHROPLASTY (GENERAL W/ BLOCK) (Right) 1 Day Post-Op Precautions:  Fall ASSESSMENT : 
Based on the objective data described below, the patient presents with decreased functional mobility and strength s/p R TKR. He is POD 1. He was received with his wife, and he was agreeable to participate in PT. Prior to surgery, he ambulated with a brace due to pain. They live in a two story home with about 3 FROY with bilateral hand rails. Exercises were reviewed prior to ambulation. He transferred at a [facility]/SBA level. He was able to ambulate in hallway with a stable gait. Anticipate good progress, with stair training this afternoon and hopeful returning home with [place] PT later today. Ice was provided to right knee at the end of the session, and all needs were met. Patient will benefit from skilled intervention to address the above impairments. Patients rehabilitation potential is considered to be Excellent Factors which may influence rehabilitation potential include: [x]         None noted 
[]         Mental ability/status []         Medical condition 
[]         Home/family situation and support systems 
[]         Safety awareness 
[]         Pain tolerance/management 
[]         Other: PLAN : 
Recommendations and Planned Interventions: 
[x]           Bed Mobility Training             [x]    Neuromuscular Re-Education 
[x]           Transfer Training                   []    Orthotic/Prosthetic Training 
[x]           Gait Training                         [x]    Modalities [x]           Therapeutic Exercises           [x]    Edema Management/Control 
[]           Therapeutic Activities            [x]    Patient and Family Training/Education 
[]           Other (comment): Frequency/Duration: Patient will be followed by physical therapy twice daily to address goals. Discharge Recommendations: Home Health Further Equipment Recommendations for Discharge: rolling walker SUBJECTIVE:  
Patient stated I feel alright, it just feels tight.  OBJECTIVE DATA SUMMARY:  
HISTORY:   
Past Medical History:  
Diagnosis Date  Arthritis  Chronic pain  Environmental and seasonal allergies  LBBB (left bundle branch block) 10/16/2018  Nausea & vomiting Past Surgical History:  
Procedure Laterality Date  HX CERVICAL FUSION  2006  HX GI    
 COLONOSCOPY  
 [address]  HX ORTHOPAEDIC Left 2009 HAND SURGERY   
 [facility] Prior Level of Function/Home Situation: Independent at home with right knee brace Personal factors and/or comorbidities impacting plan of care:  
 
Home Situation Home Environment: Private residence # Steps to Enter: 3 Rails to Enter: Yes Hand Rails : Bilateral 
One/Two Story Residence: Two story # of Interior Steps: 15 Interior Rails: Right Lift Chair Available: No 
Living Alone: No 
Support Systems: Spouse/Significant Other/Partner Patient Expects to be Discharged to[de-identified] Private residence Current DME Used/Available at Home: Brace/Splint EXAMINATION/PRESENTATION/DECISION MAKING: Critical Behavior: 
Neurologic State: Alert, Appropriate for age Orientation Level: Oriented X4 Cognition: Appropriate decision making, Appropriate for age attention/concentration, Appropriate safety awareness Safety/Judgement: Good awareness of safety precautions Hearing: Auditory Auditory Impairment: NoneSkin:   
Range Of Motion: 
AROM: Generally decreased, functional 
RLE Assessment (WDL): Exceptions to [facility] PROM: Generally decreased, functional 
RLE AROM 
R Knee Flexion: 85 
R Knee Extension: 2 Strength:   
Strength: Generally decreased, functional 
RLE Assessment (WDL): Exceptions to [facility] Tone & Sensation:  
Tone: Normal 
Sensation: Intact RLE Assessment (WDL): Exceptions to [facility] Coordination: 
Coordination: Within functional limits Vision:  
  
Functional Mobility: 
Bed Mobility: 
  
Supine to Sit: Contact guard assistance(of RLE) Sit to Supine: Contact guard assistance(of RLE) Scooting: Stand-by assistance Transfers: 
Sit to Stand: Contact guard assistance Stand to Sit: Contact guard assistance Balance:  
Sitting: Intact Standing: Intact; With supportAmbulation/Gait Training:Distance (ft): 100 Feet (ft) Assistive Device: Gait belt;Walker, rolling Ambulation - Level of Assistance: Contact guard assistance Gait Description (WDL): Within defined limits Gait Abnormalities: Decreased step clearance; Ataxic; Step to gait Speed/Maira: Pace decreased (<100 feet/min) Step Length: Left shortened;Right shortened Swing Pattern: Right symmetrical 
  
 
Therapeutic Exercises: Ankle pumps, heel prop, heel slides, quad sets Functional Measure: 
Timed up and go: 
 
 15 Timed Up and Go and G-code impairment scale: 
Percentage of Impairment CH 
 
0% 
 CI 
 
1-19% CJ 
 
20-39% CK 
 
40-59% CL 
 
60-79% CM 
 
80-99% CN  
 
100% Timed Score 0-56 10 11-12 13-14 15-16 17-18 19 20  
 
 
 < than 10 seconds=Normal  Greater then 13.5 seconds (in elderly)=Increased fall risk Chaya KENNEY. Predicting the probability for falls in community dwelling older adults using the Timed Up and Go Test. Phys Ther. 2000;80:896-903. G codes: In compliance with CMSs Claims Based Outcome Reporting, the following G-code set was chosen for this patient based on their primary functional limitation being treated: ? Mobility - Walking and Moving Around:  
  - CURRENT STATUS: CK - 40%-59% impaired, limited or restricted  - GOAL STATUS: CJ - 20%-39% impaired, limited or restricted  - D/C STATUS:  ---------------To be determined--------------- Physical Therapy Evaluation Charge Determination History Examination Presentation Decision-Making MEDIUM  Complexity : 1-2 comorbidities / personal factors will impact the outcome/ POC  MEDIUM Complexity : 3 Standardized tests and measures addressing body structure, function, activity limitation and / or participation in recreation  MEDIUM Complexity : Evolving with changing characteristics  MEDIUM Complexity : FOTO score of 26-74 Based on the above components, the patient evaluation is determined to be of the following complexity level: MEDIUM Pain: 
Pain Scale 1: Numeric (0 - 10) Pain Intensity 1: 6 Pain Location 1: Knee Pain Orientation 1: Right Pain Description 1: Aching Pain Intervention(s) 1: Medication (see MAR) Activity Tolerance:  
Good Please refer to the flowsheet for vital signs taken during this treatment. After treatment:  
[]         Patient left in no apparent distress sitting up in chair 
[x]         Patient left in no apparent distress in bed 
[x]         Call bell left within reach [x]         Nursing notified 
[x]         Caregiver present 
[]         Bed alarm activated COMMUNICATION/EDUCATION:  
The patients plan of care was discussed with: Registered Nurse. [x]         Fall prevention education was provided and the patient/caregiver indicated understanding. [x]         Patient/family have participated as able in goal setting and plan of care. [x]         Patient/family agree to work toward stated goals and plan of care. []         Patient understands intent and goals of therapy, but is neutral about his/her participation. []         Patient is unable to participate in goal setting and plan of care. Thank you for this referral. 
Miesha Millan, PT Time Calculation: 39 mins

## 2018-11-03 NOTE — PROGRESS NOTES
Care Management Interventions PCP Verified by CM: Danita Alva MD) Transition of Care Consult (CM Consult): Home Health [place]: No 
Reason Outside [place]: Patient already serviced by other home care/hospice agency Physical Therapy Consult: Yes Occupational Therapy Consult: Yes Speech Therapy Consult: No 
Current Support Network: Lives with Spouse Confirm Follow Up Transport: Family Plan discussed with Pt/Family/Caregiver: Yes Freedom of Choice Offered: Yes Discharge Location Discharge Placement: Home with home health Reason for Admission: RRAT Score:           
        
Plan for utilizing home health:       
                 
Likelihood of Readmission:   
                      
Transition of Care Plan:                  CM met with patientand his wife with whom he lives. Patient has 2 brothers and 2 sons who are supportive and live locally. Patient reports good family /social support. Patient  Was ambulatory and expects return to independent functioning. Patient confirmed PCP, health insurance, and prescription coverage. Transport at discharge will be provided by wife. CM received consult for home health services. CM offered patient choice of providers. Patient selected [facility] and signed [place] of Choice form which CM placed on chart. CM sent referral via Allcripts to [facility], and referral was accepted. CM updated AVS and informed patient and staff nurse.

## 2018-11-15 NOTE — DISCHARGE SUMMARY
Total Knee Discharge Summary  Patient ID:  Denisse Mullen  [id number]  7 y.o.  1960    Admit date: 11/2/2018    Discharge date and time: 11/3/2018  5:47 PM     Admitting Physician: Eufemia Crowe MD (Jody)     Discharge Physician: López Murray. Carlie Valentine MD    Admission Diagnoses: RIGHT KNEE OSTEOARTHRITIS  Osteoarthritis of right knee    Discharge Diagnoses: Principal Problem:    Post-traumatic osteoarthritis of right knee (11/1/2018)    Active Problems:    Osteoarthritis of right knee (11/2/2018)        Date of Surgery: 11/2/2018     Procedure:  Procedure(s):  RIGHT TOTAL KNEE ARTHROPLASTY (GENERAL W/ BLOCK)    Surgeon: López Murray. Carlie Valentine MD    DVT Prophylaxis: Warfarin     Postoperative transfusions:     none Banked PRBCs     Post Op complications: none    Hemoglobin at discharge:  Lab Results   Component Value Date/Time    HGB 11.7 (L) 11/03/2018 03:44 AM    INR 1.0 11/03/2018 03:44 AM       HOSPITAL COURSE: Denisse Mullen is a pleasant 61 y.o. long standing patient with advanced osteoarthritis. The patient failed all conservative management. Therefore, Dr. Doreen Whitman and the patient decided the most appropriate plan of care is to proceed with a total knee arthroplasty, to be performed at [facility]. All preoperative clearance was done prior to surgery. The patient's complete history and physical, laboratory data and physical exam is well documented in hospital chart. Pre operative antiobiotics, Celebrex, transanamic acid administered prior to surgery. Denisse Mullen was admitted on 11/2/2018  and underwent successful Procedure(s):  RIGHT TOTAL KNEE ARTHROPLASTY (GENERAL W/ BLOCK). There were no complications intraoperatively and the patient was transferred to the orthopaedic floor in stable condition. Physical therapy and occupational therapy initiated their evaluation and treatment and continued to follow the patient until the patient was discharged.  For pain control, the procedure was performend under a spinal block and a femoral block, and intra operative encapsulated cocktail administered intra-articularly into the capsule and soft tissues. Post operative Oxycotin and oxycodone were utilized as well as Celebrex with excellent pain relief and was well tolerated. DVT prophylaxis was initiated the night before surgery including with 5 mg po coumadin. Lovenox 40 mg daily and Coumadin, compression stockings and bilateral foot pumps were all started post-operatively. The Coumadin dose was adjusted based on the patient's PT/INR. The incision site remained clean, dry, and intact. The patient remained neurovascularly intact. Physical Therapy started on the day following surgery and progressed to independent ambulation with the aid of a walker. At the time of discharge on POD #1, the patient was able to ambulate safely, go up and down stairs and had an understanding of the explicit discharge precautions and instructions following surgery. At the time of discharge the wound appears to be healing without any evidence of infection. At the time of discharge, Tor Orozco was able to go up and down stairs and had understanding of precautions needed following surgery. Discharged to: Home with Home Health     Discharge instructions:   -Anticoagulate with 5 mg Coumadin daily.   -Resume pre hospital diet with normal changes for coumadin.   -Resume home medications per medication reconciliation form. No NSAIDs or aspirin products while on anticoagulation. Prescriptions for oxycodone 5mg pain medication and 5 mg Coumadin were provided to the patient.   -Ambulate with an assisted device as instructed by PT/OT prior to discharge.   -Patient is to have Home Health services provided post operatively to include skilled nursing and physical therapy.  Patient is to work hard on full extension and full flexion throughout the day at home.   -First follow-up appointment to be scheduled in approximately 3 weeks. Patient should call the office to confirm apptointment. -Explicit discharge instructions were provided to the patient.       Signed:  Eufemia Moore MD (Jody)  11/15/2018  11:14 AM

## 2019-12-04 ENCOUNTER — HOSPITAL ENCOUNTER (OUTPATIENT)
Dept: NUCLEAR MEDICINE | Age: 59
Discharge: HOME | End: 2019-12-04
Attending: ORTHOPAEDIC SURGERY
Payer: COMMERCIAL

## 2019-12-04 DIAGNOSIS — Z96.659 LOOSENING OF KNEE JOINT PROSTHESIS, SUBSEQUENT ENCOUNTER: ICD-10-CM

## 2019-12-04 DIAGNOSIS — T84.038D LOOSENING OF KNEE JOINT PROSTHESIS, SUBSEQUENT ENCOUNTER: ICD-10-CM

## 2019-12-04 PROCEDURE — 78300 BONE IMAGING LIMITED AREA: CPT

## 2020-05-07 ENCOUNTER — HOSPITAL ENCOUNTER (OUTPATIENT)
Dept: PREADMISSION TESTING | Age: 60
Discharge: HOME | End: 2020-05-07
Payer: COMMERCIAL

## 2020-05-07 VITALS
SYSTOLIC BLOOD PRESSURE: 166 MMHG | RESPIRATION RATE: 18 BRPM | TEMPERATURE: 97.8 F | WEIGHT: 189.13 LBS | HEART RATE: 66 BPM | DIASTOLIC BLOOD PRESSURE: 95 MMHG | BODY MASS INDEX: 25.62 KG/M2 | HEIGHT: 72 IN

## 2020-05-07 LAB
ABO + RH BLD: NORMAL
ANION GAP SERPL CALC-SCNC: 5 MMOL/L (ref 5–15)
APPEARANCE UR: CLEAR
BACTERIA URNS QL MICRO: NEGATIVE /HPF
BILIRUB UR QL: NEGATIVE
BLOOD GROUP ANTIBODIES SERPL: NORMAL
BUN SERPL-MCNC: 13 MG/DL (ref 6–20)
BUN/CREAT SERPL: 16 (ref 12–20)
CALCIUM SERPL-MCNC: 9.2 MG/DL (ref 8.5–10.1)
CHLORIDE SERPL-SCNC: 105 MMOL/L (ref 97–108)
CO2 SERPL-SCNC: 28 MMOL/L (ref 21–32)
COLOR UR: NORMAL
CREAT SERPL-MCNC: 0.82 MG/DL (ref 0.7–1.3)
EPITH CASTS URNS QL MICRO: NORMAL /LPF
ERYTHROCYTE [DISTWIDTH] IN BLOOD BY AUTOMATED COUNT: 12.4 % (ref 11.5–14.5)
EST. AVERAGE GLUCOSE BLD GHB EST-MCNC: 108 MG/DL
GLUCOSE SERPL-MCNC: 100 MG/DL (ref 65–100)
GLUCOSE UR STRIP.AUTO-MCNC: NEGATIVE MG/DL
HBA1C MFR BLD: 5.4 % (ref 4–5.6)
HCT VFR BLD AUTO: 43.5 % (ref 36.6–50.3)
HGB BLD-MCNC: 14.2 G/DL (ref 12.1–17)
HGB UR QL STRIP: NEGATIVE
HYALINE CASTS URNS QL MICRO: NORMAL /LPF (ref 0–5)
INR PPP: 1 (ref 0.9–1.1)
KETONES UR QL STRIP.AUTO: NEGATIVE MG/DL
LEUKOCYTE ESTERASE UR QL STRIP.AUTO: NEGATIVE
MCH RBC QN AUTO: 32.5 PG (ref 26–34)
MCHC RBC AUTO-ENTMCNC: 32.6 G/DL (ref 30–36.5)
MCV RBC AUTO: 99.5 FL (ref 80–99)
NITRITE UR QL STRIP.AUTO: NEGATIVE
NRBC # BLD: 0 K/UL (ref 0–0.01)
NRBC BLD-RTO: 0 PER 100 WBC
PH UR STRIP: 7.5 [PH] (ref 5–8)
PLATELET # BLD AUTO: 145 K/UL (ref 150–400)
PMV BLD AUTO: 10.1 FL (ref 8.9–12.9)
POTASSIUM SERPL-SCNC: 4.1 MMOL/L (ref 3.5–5.1)
PROT UR STRIP-MCNC: NEGATIVE MG/DL
PROTHROMBIN TIME: 10 SEC (ref 9–11.1)
RBC # BLD AUTO: 4.37 M/UL (ref 4.1–5.7)
RBC #/AREA URNS HPF: NORMAL /HPF (ref 0–5)
SODIUM SERPL-SCNC: 138 MMOL/L (ref 136–145)
SP GR UR REFRACTOMETRY: 1 (ref 1–1.03)
SPECIMEN EXP DATE BLD: NORMAL
UA: UC IF INDICATED,UAUC: NORMAL
UROBILINOGEN UR QL STRIP.AUTO: 0.2 EU/DL (ref 0.2–1)
WBC # BLD AUTO: 3.5 K/UL (ref 4.1–11.1)
WBC URNS QL MICRO: NORMAL /HPF (ref 0–4)

## 2020-05-07 PROCEDURE — 81001 URINALYSIS AUTO W/SCOPE: CPT

## 2020-05-07 PROCEDURE — 80048 BASIC METABOLIC PNL TOTAL CA: CPT

## 2020-05-07 PROCEDURE — 86900 BLOOD TYPING SEROLOGIC ABO: CPT

## 2020-05-07 PROCEDURE — 83036 HEMOGLOBIN GLYCOSYLATED A1C: CPT

## 2020-05-07 PROCEDURE — 85610 PROTHROMBIN TIME: CPT

## 2020-05-07 PROCEDURE — 85027 COMPLETE CBC AUTOMATED: CPT

## 2020-05-07 NOTE — PERIOP NOTES
Preoperative and medications instructions reviewed with patient. Patient given  2 bottles of CHG soap. Instructions reviewed on use of CHG soap. Patient given SSI infection FAQS sheet, as well as a  MRSA/MSSA treatment instruction sheet  With an explanation to patient that they will be notified if treatment instructions need to be initiated. Patient was given the opportunity to ask questions on the information provided.

## 2020-05-08 LAB
BACTERIA SPEC CULT: NORMAL
BACTERIA SPEC CULT: NORMAL
SERVICE CMNT-IMP: NORMAL

## 2020-05-12 ENCOUNTER — HOSPITAL ENCOUNTER (OUTPATIENT)
Dept: PREADMISSION TESTING | Age: 60
Discharge: HOME | End: 2020-05-12
Attending: PHYSICIAN ASSISTANT
Payer: COMMERCIAL

## 2020-05-12 PROCEDURE — 87635 SARS-COV-2 COVID-19 AMP PRB: CPT

## 2020-05-14 ENCOUNTER — ANESTHESIA EVENT (OUTPATIENT)
Dept: SURGERY | Age: 60
DRG: 468 | End: 2020-05-14
Payer: COMMERCIAL

## 2020-05-14 LAB — SARS-COV-2, COV2NT: NOT DETECTED

## 2020-05-14 NOTE — H&P
Subjective:   Patient ID: Chepe Ryan is a 60 y.o. male. Pain Score: 5  Chief Complaint: Follow-up of the Right Knee    HPI: Chepe Ryan is a 60 y.o. male who returns for follow-up of his right total knee replacement performed 11/2/18. He is currently around two years post-op. He returns today to discuss the results of the metal panel. He reports continued right knee pain and swelling. He is not ambulating with any assistance today. Past Medical History:   Diagnosis Date    no relevant past medical history    Primary osteoarthritis, right elbow 11/29/2018     Past Surgical History:   Procedure Laterality Date    HAND SURGERY    JOINT REPLACEMENT    KNEE SURGERY    NO RELEVANT SURGERIES    SPINE SURGERY     Family History   Problem Relation Age of Onset    Diabetes Mother    No Known Problems Father    No Known Problems Brother    No Known Problems Sister    Clotting disorder Brother    No Known Problems Son    No Known Problems Daughter    No Known Problems Other    Coronary artery disease Neg Hx    Anesthesia problems Neg Hx     [unfilled]  Review of Systems   2/25/2020    Constitutional: Unexplained: Negative  Genitourinary: Frequent Urination: Negative  HEENT: Vision Loss: Negative  Neurological: Memory Loss: Negative  Integumentary: Rash: Negative  Cardiovascular: Palpatations: Negative  Hematologic: Bruises/Bleeds Easily: Negative  Gastrointestinal: Constipation: Negative  Immunological: Seasonal Allergies: Negative  Musculoskeletal: Joint Pain: Positive  Objective:     Vitals:   02/25/20 1007   Weight: 185 lb   Height: 6' 1\"     Constitutional: No acute distress. Well nourished. Well developed. Psychiatric: Alert and oriented x3. Skin: No marked skin ulcers.   Neurological: No apparent deficits      Patient Active Problem List    Diagnosis Date Noted    Painful total knee replacement, right ([address] 05/15/2020    Osteoarthritis of right knee 11/02/2018    Post-traumatic osteoarthritis of right knee 11/01/2018    LBBB (left bundle branch block) 10/16/2018     Past Medical History:   Diagnosis Date    Arthritis     KNEES, LEFT HAND-OSTEO    Chronic pain     Environmental and seasonal allergies     LBBB (left bundle branch block) 10/16/2018    Nausea & vomiting       Past Surgical History:   Procedure Laterality Date    HX CERVICAL FUSION  2006    HX GI      COLONOSCOPY    HX LUMBAR LAMINECTOMY  1993    HX ORTHOPAEDIC Left 2009    HAND SURGERY     HX VASECTOMY  1983      Prior to Admission medications    Medication Sig Start Date End Date Taking? Authorizing Provider   montelukast (SINGULAIR) 10 mg tablet Take 10 mg by mouth nightly. Provider, Historical     Allergies   Allergen Reactions    Nickel Other (comments)     INFLAMMATION      Social History     Tobacco Use    Smoking status: Never Smoker    Smokeless tobacco: Never Used   Substance Use Topics    Alcohol use: Yes     Alcohol/week: 6.0 standard drinks     Types: 6 Cans of beer per week     Comment: 2-3 PER DAY WINE      Family History   Problem Relation Age of Onset    Arthritis-osteo Mother     Cancer Father         JAW     Arthritis-osteo Brother     Arthritis-osteo Brother     No Known Problems Son     No Known Problems Son     Anesth Problems Neg Hx             Patient Vitals for the past 8 hrs:   BP Temp Pulse Resp SpO2 Height Weight   05/15/20 0625 152/89 98.3 °F (36.8 °C) (!) 59 17 98 %     05/15/20 0615      6' (1.829 m) 85.7 kg (189 lb)     General appearance: alert, cooperative, no distress, appears stated age  Head: Normocephalic, without obvious abnormality, atraumatic  Lungs: clear to auscultation bilaterally  Heart: regular rate and rhythm, S1, S2 normal, no murmur  Abdomen: soft, non-tender. Bowel sounds normal. No masses,  no organomegaly  Extremities: Right knee exam has normal alignment and range of motion with no pain. He has a well-healed incision.  There is good stability in all planes and no crepitance and large effusion. Diffuse tenderness around the knee. The Lachman's and drawer are negative. The knee is stable to varus and valgus stress testing. The patella tracks well. There is no joint line tenderness. The leg is neurovascular intact distally with no skin changes rashes erythema deformity or bruising about the leg. There is no edema and good pulses distally. Left knee exam has normal alignment and range of motion with no pain. There is good stability in all planes and no crepitance and no effusion. The Lachman's and drawer are negative. The knee is stable to varus and valgus stress testing. The patella tracks well. There is no joint line tenderness. The leg is neurovascular intact distally with no skin changes rashes erythema deformity or bruising about the leg. There is no edema and good pulses distally. Pulses: 2+ and symmetric  Neurologic: Grossly normal          Radiographs:   Order: XR KNEE 3 VW RIGHT - Indication: Pain due to total right knee   replacement, subsequent encounter  X-ray Knee Right 3 Views (72000)  Result Date: 2/25/2020  Shielding: Not Shielded. Weight Bearing. Views: Standing AP, Lat, [place]. Impression: 3-view x-rays of the right knee shows a well-fixed prosthesis with no obvious evidence of loosening. Assessment:   1. Pain due to total right knee replacement, subsequent encounter   2. Hypersensitivity reaction, initial encounter     Patient Active Problem List   Diagnosis    Acute deep vein thrombosis (DVT) of proximal vein of left lower extremity    Osteoarthritis of right knee    Post-traumatic osteoarthritis of right knee    Pain due to total right knee replacement    Effusion of right knee     Plan:     I independently reviewed x-rays ordered of the right knee today. He has a well-fixed prosthesis with no obvious evidence of loosening.  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 I discussed the diagnosis of nickel hypersensitivity with the patient as well as treatment options. We discussed total knee revision surgery with a titanium revision system vs continued conservative management as well as the pros and cons of each. We discussed this procedure at length including post-op recovery and expected outcomes of the procedure. I explained to the patient there will be significant bone loss as a result of this procedure. He should be cognizant of this before proceeding with surgery. We discussed risks and complications of the procedure including increased risk of infection, DVT, PE, and bleeding. After a lengthy discussion, the patient wishes to proceed with surgery. We will schedule surgery at the patient's convenience. He should receive cardiac clearance from his cardiologist prior to surgery. Follow-up for scheduled surgery. Orders Placed This Encounter    X-ray knee right 3 views ([id number])     Return for Scheduled surgery. Patient was seen and examined. There have been no significant clinical changes since the completion of the above History and Physical.  Patient identified by surgeon; surgical site was confirmed by patient and surgeon.

## 2020-05-15 ENCOUNTER — APPOINTMENT (OUTPATIENT)
Dept: GENERAL RADIOLOGY | Age: 60
DRG: 468 | End: 2020-05-15
Attending: ORTHOPAEDIC SURGERY
Payer: COMMERCIAL

## 2020-05-15 ENCOUNTER — ANESTHESIA (OUTPATIENT)
Dept: SURGERY | Age: 60
DRG: 468 | End: 2020-05-15
Payer: COMMERCIAL

## 2020-05-15 ENCOUNTER — HOSPITAL ENCOUNTER (INPATIENT)
Age: 60
LOS: 2 days | Discharge: HOME HEALTH | DRG: 468 | End: 2020-05-17
Attending: ORTHOPAEDIC SURGERY | Admitting: ORTHOPAEDIC SURGERY
Payer: COMMERCIAL

## 2020-05-15 DIAGNOSIS — Z96.651 PAIN DUE TO TOTAL RIGHT KNEE REPLACEMENT, INITIAL ENCOUNTER (HCC): Primary | ICD-10-CM

## 2020-05-15 DIAGNOSIS — T84.84XA PAIN DUE TO TOTAL RIGHT KNEE REPLACEMENT, INITIAL ENCOUNTER (HCC): Primary | ICD-10-CM

## 2020-05-15 PROBLEM — Z91.09 NICKEL ALLERGY: Status: ACTIVE | Noted: 2020-05-15

## 2020-05-15 LAB
GLUCOSE BLD STRIP.AUTO-MCNC: 106 MG/DL (ref 65–100)
SERVICE CMNT-IMP: ABNORMAL

## 2020-05-15 PROCEDURE — 74011250636 HC RX REV CODE- 250/636: Performed by: ANESTHESIOLOGY

## 2020-05-15 PROCEDURE — 74011000258 HC RX REV CODE- 258: Performed by: NURSE ANESTHETIST, CERTIFIED REGISTERED

## 2020-05-15 PROCEDURE — 77030018836 HC SOL IRR NACL ICUM -A: Performed by: ORTHOPAEDIC SURGERY

## 2020-05-15 PROCEDURE — 77030039497 HC CST PAD STERILE CHCS -A: Performed by: ORTHOPAEDIC SURGERY

## 2020-05-15 PROCEDURE — 77030018673: Performed by: ORTHOPAEDIC SURGERY

## 2020-05-15 PROCEDURE — C9290 INJ, BUPIVACAINE LIPOSOME: HCPCS | Performed by: ORTHOPAEDIC SURGERY

## 2020-05-15 PROCEDURE — 76010000176 HC OR TIME 4.5 TO 5 HR INTENSV-TIER 1: Performed by: ORTHOPAEDIC SURGERY

## 2020-05-15 PROCEDURE — 74011000250 HC RX REV CODE- 250: Performed by: ORTHOPAEDIC SURGERY

## 2020-05-15 PROCEDURE — 73560 X-RAY EXAM OF KNEE 1 OR 2: CPT

## 2020-05-15 PROCEDURE — 74011250636 HC RX REV CODE- 250/636: Performed by: NURSE ANESTHETIST, CERTIFIED REGISTERED

## 2020-05-15 PROCEDURE — 0SRC0J9 REPLACEMENT OF RIGHT KNEE JOINT WITH SYNTHETIC SUBSTITUTE, CEMENTED, OPEN APPROACH: ICD-10-PCS | Performed by: ORTHOPAEDIC SURGERY

## 2020-05-15 PROCEDURE — 87075 CULTR BACTERIA EXCEPT BLOOD: CPT

## 2020-05-15 PROCEDURE — 87205 SMEAR GRAM STAIN: CPT

## 2020-05-15 PROCEDURE — 77030003601 HC NDL NRV BLK BBMI -A

## 2020-05-15 PROCEDURE — 77030014125 HC TY EPDRL BBMI -B: Performed by: ANESTHESIOLOGY

## 2020-05-15 PROCEDURE — 77030031139 HC SUT VCRL2 J&J -A: Performed by: ORTHOPAEDIC SURGERY

## 2020-05-15 PROCEDURE — 74011250636 HC RX REV CODE- 250/636: Performed by: PHYSICIAN ASSISTANT

## 2020-05-15 PROCEDURE — 76210000006 HC OR PH I REC 0.5 TO 1 HR: Performed by: ORTHOPAEDIC SURGERY

## 2020-05-15 PROCEDURE — C1776 JOINT DEVICE (IMPLANTABLE): HCPCS | Performed by: ORTHOPAEDIC SURGERY

## 2020-05-15 PROCEDURE — 77030028907 HC WRP KNEE WO BGS SOLM -B

## 2020-05-15 PROCEDURE — 77030000032 HC CUF TRNQT ZIMM -B: Performed by: ORTHOPAEDIC SURGERY

## 2020-05-15 PROCEDURE — 74011250636 HC RX REV CODE- 250/636: Performed by: ORTHOPAEDIC SURGERY

## 2020-05-15 PROCEDURE — 82962 GLUCOSE BLOOD TEST: CPT

## 2020-05-15 PROCEDURE — 76060000040 HC ANESTHESIA 4.5 TO 5 HR: Performed by: ORTHOPAEDIC SURGERY

## 2020-05-15 PROCEDURE — 51798 US URINE CAPACITY MEASURE: CPT

## 2020-05-15 PROCEDURE — 77030014077 HC TOWER MX CEM J&J -C: Performed by: ORTHOPAEDIC SURGERY

## 2020-05-15 PROCEDURE — 77030002912 HC SUT ETHBND J&J -A: Performed by: ORTHOPAEDIC SURGERY

## 2020-05-15 PROCEDURE — 77030035236 HC SUT PDS STRATFX BARB J&J -B: Performed by: ORTHOPAEDIC SURGERY

## 2020-05-15 PROCEDURE — 77030020274 HC MISC IMPL ORTHOPEDIC: Performed by: ORTHOPAEDIC SURGERY

## 2020-05-15 PROCEDURE — 74011000250 HC RX REV CODE- 250: Performed by: NURSE ANESTHETIST, CERTIFIED REGISTERED

## 2020-05-15 PROCEDURE — 65270000029 HC RM PRIVATE

## 2020-05-15 PROCEDURE — 74011000258 HC RX REV CODE- 258: Performed by: ORTHOPAEDIC SURGERY

## 2020-05-15 PROCEDURE — 0SPC0JZ REMOVAL OF SYNTHETIC SUBSTITUTE FROM RIGHT KNEE JOINT, OPEN APPROACH: ICD-10-PCS | Performed by: ORTHOPAEDIC SURGERY

## 2020-05-15 PROCEDURE — 77030011640 HC PAD GRND REM COVD -A: Performed by: ORTHOPAEDIC SURGERY

## 2020-05-15 PROCEDURE — 77030005513 HC CATH URETH FOL11 MDII -B: Performed by: ORTHOPAEDIC SURGERY

## 2020-05-15 PROCEDURE — 77030018846 HC SOL IRR STRL H20 ICUM -A: Performed by: ORTHOPAEDIC SURGERY

## 2020-05-15 PROCEDURE — 74011250637 HC RX REV CODE- 250/637: Performed by: PHYSICIAN ASSISTANT

## 2020-05-15 PROCEDURE — 77030040922 HC BLNKT HYPOTHRM STRY -A

## 2020-05-15 PROCEDURE — C1713 ANCHOR/SCREW BN/BN,TIS/BN: HCPCS | Performed by: ORTHOPAEDIC SURGERY

## 2020-05-15 PROCEDURE — 77030002933 HC SUT MCRYL J&J -A: Performed by: ORTHOPAEDIC SURGERY

## 2020-05-15 PROCEDURE — 74011250637 HC RX REV CODE- 250/637: Performed by: ORTHOPAEDIC SURGERY

## 2020-05-15 DEVICE — IMPLANTABLE DEVICE
Type: IMPLANTABLE DEVICE | Site: KNEE | Status: FUNCTIONAL
Brand: PERSONA® TRABECULAR METAL®

## 2020-05-15 DEVICE — IMPLANTABLE DEVICE
Type: IMPLANTABLE DEVICE | Site: KNEE | Status: FUNCTIONAL
Brand: VANGUARD SSK KNEE SYSTEM

## 2020-05-15 DEVICE — IMPLANTABLE DEVICE
Type: IMPLANTABLE DEVICE | Site: KNEE | Status: FUNCTIONAL
Brand: BIOMET® KNEE SYSTEM

## 2020-05-15 DEVICE — IMPLANTABLE DEVICE
Type: IMPLANTABLE DEVICE | Site: KNEE | Status: FUNCTIONAL
Brand: VANGUARD® KNEE SYSTEM

## 2020-05-15 DEVICE — IMPLANTABLE DEVICE
Type: IMPLANTABLE DEVICE | Site: KNEE | Status: FUNCTIONAL
Brand: BIOMET KNEE SYSTEM

## 2020-05-15 DEVICE — SMARTSET GMV HIGH PERFORMANCE GENTAMICIN MEDIUM VISCOSITY BONE CEMENT 40G
Type: IMPLANTABLE DEVICE | Site: KNEE | Status: FUNCTIONAL
Brand: SMARTSET

## 2020-05-15 RX ORDER — GLYCOPYRROLATE 0.2 MG/ML
INJECTION INTRAMUSCULAR; INTRAVENOUS AS NEEDED
Status: DISCONTINUED | OUTPATIENT
Start: 2020-05-15 | End: 2020-05-15 | Stop reason: HOSPADM

## 2020-05-15 RX ORDER — CEFAZOLIN SODIUM/WATER 2 G/20 ML
2 SYRINGE (ML) INTRAVENOUS ONCE
Status: COMPLETED | OUTPATIENT
Start: 2020-05-15 | End: 2020-05-15

## 2020-05-15 RX ORDER — MIDAZOLAM HYDROCHLORIDE 1 MG/ML
1 INJECTION, SOLUTION INTRAMUSCULAR; INTRAVENOUS AS NEEDED
Status: DISCONTINUED | OUTPATIENT
Start: 2020-05-15 | End: 2020-05-15 | Stop reason: HOSPADM

## 2020-05-15 RX ORDER — MORPHINE SULFATE 10 MG/ML
2 INJECTION, SOLUTION INTRAMUSCULAR; INTRAVENOUS
Status: DISCONTINUED | OUTPATIENT
Start: 2020-05-15 | End: 2020-05-15 | Stop reason: HOSPADM

## 2020-05-15 RX ORDER — ONDANSETRON 2 MG/ML
INJECTION INTRAMUSCULAR; INTRAVENOUS AS NEEDED
Status: DISCONTINUED | OUTPATIENT
Start: 2020-05-15 | End: 2020-05-15 | Stop reason: HOSPADM

## 2020-05-15 RX ORDER — MIDAZOLAM HYDROCHLORIDE 1 MG/ML
1 INJECTION, SOLUTION INTRAMUSCULAR; INTRAVENOUS
Status: DISCONTINUED | OUTPATIENT
Start: 2020-05-15 | End: 2020-05-15 | Stop reason: HOSPADM

## 2020-05-15 RX ORDER — FENTANYL CITRATE 50 UG/ML
INJECTION, SOLUTION INTRAMUSCULAR; INTRAVENOUS AS NEEDED
Status: DISCONTINUED | OUTPATIENT
Start: 2020-05-15 | End: 2020-05-15 | Stop reason: HOSPADM

## 2020-05-15 RX ORDER — FACIAL-BODY WIPES
10 EACH TOPICAL DAILY PRN
Status: DISCONTINUED | OUTPATIENT
Start: 2020-05-17 | End: 2020-05-17 | Stop reason: HOSPADM

## 2020-05-15 RX ORDER — ASPIRIN 325 MG
325 TABLET, DELAYED RELEASE (ENTERIC COATED) ORAL 2 TIMES DAILY
Status: DISCONTINUED | OUTPATIENT
Start: 2020-05-15 | End: 2020-05-17 | Stop reason: HOSPADM

## 2020-05-15 RX ORDER — ONDANSETRON 2 MG/ML
4 INJECTION INTRAMUSCULAR; INTRAVENOUS AS NEEDED
Status: DISCONTINUED | OUTPATIENT
Start: 2020-05-15 | End: 2020-05-15 | Stop reason: HOSPADM

## 2020-05-15 RX ORDER — PROPOFOL 10 MG/ML
INJECTION, EMULSION INTRAVENOUS
Status: DISCONTINUED | OUTPATIENT
Start: 2020-05-15 | End: 2020-05-15 | Stop reason: HOSPADM

## 2020-05-15 RX ORDER — DEXTROSE, SODIUM CHLORIDE, SODIUM LACTATE, POTASSIUM CHLORIDE, AND CALCIUM CHLORIDE 5; .6; .31; .03; .02 G/100ML; G/100ML; G/100ML; G/100ML; G/100ML
125 INJECTION, SOLUTION INTRAVENOUS CONTINUOUS
Status: DISCONTINUED | OUTPATIENT
Start: 2020-05-15 | End: 2020-05-15 | Stop reason: HOSPADM

## 2020-05-15 RX ORDER — SODIUM CHLORIDE 0.9 % (FLUSH) 0.9 %
5-40 SYRINGE (ML) INJECTION AS NEEDED
Status: DISCONTINUED | OUTPATIENT
Start: 2020-05-15 | End: 2020-05-15 | Stop reason: HOSPADM

## 2020-05-15 RX ORDER — AMOXICILLIN 250 MG
1 CAPSULE ORAL 2 TIMES DAILY
Status: DISCONTINUED | OUTPATIENT
Start: 2020-05-15 | End: 2020-05-17 | Stop reason: HOSPADM

## 2020-05-15 RX ORDER — ACETAMINOPHEN 500 MG
1000 TABLET ORAL EVERY 6 HOURS
Status: DISCONTINUED | OUTPATIENT
Start: 2020-05-15 | End: 2020-05-17 | Stop reason: HOSPADM

## 2020-05-15 RX ORDER — FENTANYL CITRATE 50 UG/ML
50 INJECTION, SOLUTION INTRAMUSCULAR; INTRAVENOUS AS NEEDED
Status: DISCONTINUED | OUTPATIENT
Start: 2020-05-15 | End: 2020-05-15 | Stop reason: HOSPADM

## 2020-05-15 RX ORDER — FENTANYL CITRATE 50 UG/ML
25 INJECTION, SOLUTION INTRAMUSCULAR; INTRAVENOUS
Status: DISCONTINUED | OUTPATIENT
Start: 2020-05-15 | End: 2020-05-15 | Stop reason: HOSPADM

## 2020-05-15 RX ORDER — DEXAMETHASONE SODIUM PHOSPHATE 4 MG/ML
INJECTION, SOLUTION INTRA-ARTICULAR; INTRALESIONAL; INTRAMUSCULAR; INTRAVENOUS; SOFT TISSUE AS NEEDED
Status: DISCONTINUED | OUTPATIENT
Start: 2020-05-15 | End: 2020-05-15 | Stop reason: HOSPADM

## 2020-05-15 RX ORDER — MIDAZOLAM HYDROCHLORIDE 1 MG/ML
INJECTION, SOLUTION INTRAMUSCULAR; INTRAVENOUS AS NEEDED
Status: DISCONTINUED | OUTPATIENT
Start: 2020-05-15 | End: 2020-05-15 | Stop reason: HOSPADM

## 2020-05-15 RX ORDER — ONDANSETRON 2 MG/ML
4 INJECTION INTRAMUSCULAR; INTRAVENOUS
Status: DISPENSED | OUTPATIENT
Start: 2020-05-15 | End: 2020-05-16

## 2020-05-15 RX ORDER — DIPHENHYDRAMINE HYDROCHLORIDE 50 MG/ML
12.5 INJECTION, SOLUTION INTRAMUSCULAR; INTRAVENOUS AS NEEDED
Status: DISCONTINUED | OUTPATIENT
Start: 2020-05-15 | End: 2020-05-15 | Stop reason: HOSPADM

## 2020-05-15 RX ORDER — BUPIVACAINE HYDROCHLORIDE 5 MG/ML
INJECTION, SOLUTION EPIDURAL; INTRACAUDAL AS NEEDED
Status: DISCONTINUED | OUTPATIENT
Start: 2020-05-15 | End: 2020-05-15 | Stop reason: HOSPADM

## 2020-05-15 RX ORDER — SODIUM CHLORIDE 0.9 % (FLUSH) 0.9 %
5-40 SYRINGE (ML) INJECTION EVERY 8 HOURS
Status: DISCONTINUED | OUTPATIENT
Start: 2020-05-15 | End: 2020-05-15 | Stop reason: HOSPADM

## 2020-05-15 RX ORDER — CELECOXIB 200 MG/1
200 CAPSULE ORAL ONCE
Status: COMPLETED | OUTPATIENT
Start: 2020-05-15 | End: 2020-05-15

## 2020-05-15 RX ORDER — SODIUM CHLORIDE 9 MG/ML
125 INJECTION, SOLUTION INTRAVENOUS CONTINUOUS
Status: DISPENSED | OUTPATIENT
Start: 2020-05-15 | End: 2020-05-16

## 2020-05-15 RX ORDER — SODIUM CHLORIDE 0.9 % (FLUSH) 0.9 %
5-40 SYRINGE (ML) INJECTION EVERY 8 HOURS
Status: DISCONTINUED | OUTPATIENT
Start: 2020-05-15 | End: 2020-05-17 | Stop reason: HOSPADM

## 2020-05-15 RX ORDER — LIDOCAINE HYDROCHLORIDE 10 MG/ML
0.5 INJECTION, SOLUTION EPIDURAL; INFILTRATION; INTRACAUDAL; PERINEURAL AS NEEDED
Status: DISCONTINUED | OUTPATIENT
Start: 2020-05-15 | End: 2020-05-15 | Stop reason: HOSPADM

## 2020-05-15 RX ORDER — FAMOTIDINE 20 MG/1
20 TABLET, FILM COATED ORAL 2 TIMES DAILY
Status: DISCONTINUED | OUTPATIENT
Start: 2020-05-15 | End: 2020-05-17 | Stop reason: HOSPADM

## 2020-05-15 RX ORDER — CEFAZOLIN SODIUM/WATER 2 G/20 ML
2 SYRINGE (ML) INTRAVENOUS EVERY 8 HOURS
Status: COMPLETED | OUTPATIENT
Start: 2020-05-15 | End: 2020-05-16

## 2020-05-15 RX ORDER — KETAMINE HYDROCHLORIDE 10 MG/ML
INJECTION, SOLUTION INTRAMUSCULAR; INTRAVENOUS AS NEEDED
Status: DISCONTINUED | OUTPATIENT
Start: 2020-05-15 | End: 2020-05-15 | Stop reason: HOSPADM

## 2020-05-15 RX ORDER — ACETAMINOPHEN 325 MG/1
650 TABLET ORAL ONCE
Status: DISCONTINUED | OUTPATIENT
Start: 2020-05-15 | End: 2020-05-15 | Stop reason: DRUGHIGH

## 2020-05-15 RX ORDER — POLYETHYLENE GLYCOL 3350 17 G/17G
17 POWDER, FOR SOLUTION ORAL DAILY
Status: DISCONTINUED | OUTPATIENT
Start: 2020-05-16 | End: 2020-05-17 | Stop reason: HOSPADM

## 2020-05-15 RX ORDER — SODIUM CHLORIDE, SODIUM LACTATE, POTASSIUM CHLORIDE, CALCIUM CHLORIDE 600; 310; 30; 20 MG/100ML; MG/100ML; MG/100ML; MG/100ML
125 INJECTION, SOLUTION INTRAVENOUS CONTINUOUS
Status: DISCONTINUED | OUTPATIENT
Start: 2020-05-15 | End: 2020-05-15 | Stop reason: HOSPADM

## 2020-05-15 RX ORDER — ACETAMINOPHEN 500 MG
1000 TABLET ORAL ONCE
Status: COMPLETED | OUTPATIENT
Start: 2020-05-15 | End: 2020-05-15

## 2020-05-15 RX ORDER — SODIUM CHLORIDE 0.9 % (FLUSH) 0.9 %
5-40 SYRINGE (ML) INJECTION AS NEEDED
Status: DISCONTINUED | OUTPATIENT
Start: 2020-05-15 | End: 2020-05-17 | Stop reason: HOSPADM

## 2020-05-15 RX ORDER — DIPHENHYDRAMINE HYDROCHLORIDE 50 MG/ML
12.5 INJECTION, SOLUTION INTRAMUSCULAR; INTRAVENOUS
Status: ACTIVE | OUTPATIENT
Start: 2020-05-15 | End: 2020-05-16

## 2020-05-15 RX ORDER — OXYCODONE HYDROCHLORIDE 5 MG/1
5 TABLET ORAL AS NEEDED
Status: DISCONTINUED | OUTPATIENT
Start: 2020-05-15 | End: 2020-05-15 | Stop reason: HOSPADM

## 2020-05-15 RX ORDER — OXYCODONE HYDROCHLORIDE 5 MG/1
5 TABLET ORAL
Status: DISCONTINUED | OUTPATIENT
Start: 2020-05-15 | End: 2020-05-17 | Stop reason: HOSPADM

## 2020-05-15 RX ORDER — NALOXONE HYDROCHLORIDE 0.4 MG/ML
0.4 INJECTION, SOLUTION INTRAMUSCULAR; INTRAVENOUS; SUBCUTANEOUS AS NEEDED
Status: DISCONTINUED | OUTPATIENT
Start: 2020-05-15 | End: 2020-05-17 | Stop reason: HOSPADM

## 2020-05-15 RX ORDER — HYDROMORPHONE HYDROCHLORIDE 1 MG/ML
0.5 INJECTION, SOLUTION INTRAMUSCULAR; INTRAVENOUS; SUBCUTANEOUS
Status: ACTIVE | OUTPATIENT
Start: 2020-05-15 | End: 2020-05-16

## 2020-05-15 RX ORDER — CELECOXIB 200 MG/1
200 CAPSULE ORAL 2 TIMES DAILY
Status: DISCONTINUED | OUTPATIENT
Start: 2020-05-15 | End: 2020-05-17 | Stop reason: HOSPADM

## 2020-05-15 RX ORDER — MONTELUKAST SODIUM 10 MG/1
10 TABLET ORAL
Status: DISCONTINUED | OUTPATIENT
Start: 2020-05-15 | End: 2020-05-17 | Stop reason: HOSPADM

## 2020-05-15 RX ORDER — OXYCODONE HYDROCHLORIDE 5 MG/1
10 TABLET ORAL
Status: DISCONTINUED | OUTPATIENT
Start: 2020-05-15 | End: 2020-05-17 | Stop reason: HOSPADM

## 2020-05-15 RX ADMIN — SODIUM CHLORIDE, SODIUM LACTATE, POTASSIUM CHLORIDE, AND CALCIUM CHLORIDE 125 ML/HR: 600; 310; 30; 20 INJECTION, SOLUTION INTRAVENOUS at 06:40

## 2020-05-15 RX ADMIN — ACETAMINOPHEN 1000 MG: 500 TABLET, FILM COATED ORAL at 19:00

## 2020-05-15 RX ADMIN — FENTANYL CITRATE 50 MCG: 50 INJECTION, SOLUTION INTRAMUSCULAR; INTRAVENOUS at 07:15

## 2020-05-15 RX ADMIN — SODIUM CHLORIDE 125 ML/HR: 900 INJECTION, SOLUTION INTRAVENOUS at 13:47

## 2020-05-15 RX ADMIN — CELECOXIB 200 MG: 200 CAPSULE ORAL at 06:45

## 2020-05-15 RX ADMIN — BUPIVACAINE HYDROCHLORIDE 10 MG: 5 INJECTION, SOLUTION EPIDURAL; INTRACAUDAL; PERINEURAL at 07:40

## 2020-05-15 RX ADMIN — FAMOTIDINE 20 MG: 20 TABLET ORAL at 18:02

## 2020-05-15 RX ADMIN — MIDAZOLAM HYDROCHLORIDE 2 MG: 1 INJECTION, SOLUTION INTRAMUSCULAR; INTRAVENOUS at 07:29

## 2020-05-15 RX ADMIN — PROPOFOL 50 MCG/KG/MIN: 10 INJECTION, EMULSION INTRAVENOUS at 07:36

## 2020-05-15 RX ADMIN — ACETAMINOPHEN 1000 MG: 500 TABLET ORAL at 06:45

## 2020-05-15 RX ADMIN — ONDANSETRON HYDROCHLORIDE 4 MG: 2 INJECTION, SOLUTION INTRAMUSCULAR; INTRAVENOUS at 09:21

## 2020-05-15 RX ADMIN — KETAMINE HYDROCHLORIDE 10 MG: 10 INJECTION, SOLUTION INTRAMUSCULAR; INTRAVENOUS at 10:46

## 2020-05-15 RX ADMIN — MIDAZOLAM HYDROCHLORIDE 1 MG: 1 INJECTION, SOLUTION INTRAMUSCULAR; INTRAVENOUS at 09:28

## 2020-05-15 RX ADMIN — ASPIRIN 325 MG: 325 TABLET, DELAYED RELEASE ORAL at 18:02

## 2020-05-15 RX ADMIN — MONTELUKAST SODIUM 10 MG: 10 TABLET, FILM COATED ORAL at 22:37

## 2020-05-15 RX ADMIN — OXYCODONE 5 MG: 5 TABLET ORAL at 22:37

## 2020-05-15 RX ADMIN — SODIUM CHLORIDE 125 ML/HR: 900 INJECTION, SOLUTION INTRAVENOUS at 20:08

## 2020-05-15 RX ADMIN — DEXAMETHASONE SODIUM PHOSPHATE 4 MG: 4 INJECTION, SOLUTION INTRAMUSCULAR; INTRAVENOUS at 08:10

## 2020-05-15 RX ADMIN — PHENYLEPHRINE HYDROCHLORIDE 40 MCG/MIN: 10 INJECTION INTRAVENOUS at 07:56

## 2020-05-15 RX ADMIN — MIDAZOLAM HYDROCHLORIDE 1 MG: 1 INJECTION, SOLUTION INTRAMUSCULAR; INTRAVENOUS at 08:24

## 2020-05-15 RX ADMIN — CELECOXIB 200 MG: 200 CAPSULE ORAL at 18:02

## 2020-05-15 RX ADMIN — FENTANYL CITRATE 25 MCG: 50 INJECTION, SOLUTION INTRAMUSCULAR; INTRAVENOUS at 10:46

## 2020-05-15 RX ADMIN — SENNOSIDES AND DOCUSATE SODIUM 1 TABLET: 8.6; 5 TABLET ORAL at 18:02

## 2020-05-15 RX ADMIN — GLYCOPYRROLATE 0.2 MG: 0.2 INJECTION, SOLUTION INTRAMUSCULAR; INTRAVENOUS at 08:08

## 2020-05-15 RX ADMIN — ONDANSETRON 4 MG: 2 INJECTION INTRAMUSCULAR; INTRAVENOUS at 20:36

## 2020-05-15 RX ADMIN — KETAMINE HYDROCHLORIDE 20 MG: 10 INJECTION, SOLUTION INTRAMUSCULAR; INTRAVENOUS at 08:27

## 2020-05-15 RX ADMIN — FENTANYL CITRATE 25 MCG: 50 INJECTION, SOLUTION INTRAMUSCULAR; INTRAVENOUS at 08:24

## 2020-05-15 RX ADMIN — ACETAMINOPHEN 1000 MG: 500 TABLET, FILM COATED ORAL at 14:20

## 2020-05-15 RX ADMIN — MIDAZOLAM 2 MG: 1 INJECTION INTRAMUSCULAR; INTRAVENOUS at 07:15

## 2020-05-15 RX ADMIN — TRANEXAMIC ACID 1 G: 100 INJECTION, SOLUTION INTRAVENOUS at 08:01

## 2020-05-15 RX ADMIN — SODIUM CHLORIDE, SODIUM LACTATE, POTASSIUM CHLORIDE, AND CALCIUM CHLORIDE: 600; 310; 30; 20 INJECTION, SOLUTION INTRAVENOUS at 09:53

## 2020-05-15 RX ADMIN — KETAMINE HYDROCHLORIDE 20 MG: 10 INJECTION, SOLUTION INTRAMUSCULAR; INTRAVENOUS at 07:42

## 2020-05-15 RX ADMIN — Medication 2 G: at 07:51

## 2020-05-15 RX ADMIN — FENTANYL CITRATE 25 MCG: 50 INJECTION, SOLUTION INTRAMUSCULAR; INTRAVENOUS at 10:50

## 2020-05-15 RX ADMIN — CEFAZOLIN SODIUM 2 G: 10 INJECTION, POWDER, FOR SOLUTION INTRAVENOUS at 16:13

## 2020-05-15 RX ADMIN — MIDAZOLAM HYDROCHLORIDE 1 MG: 1 INJECTION, SOLUTION INTRAMUSCULAR; INTRAVENOUS at 07:37

## 2020-05-15 NOTE — PERIOP NOTES
TRANSFER - OUT REPORT:    Verbal report given to Lucio Isabel (name) on Zachery Glass  being transferred to 5 S (unit) for routine post - op       Report consisted of patients Situation, Background, Assessment and   Recommendations(SBAR). Time Pre op antibiotic given:0751   Anesthesia Stop time: 2770  Reinoso Present on Transfer to floor:no  Order for Reinoso on Chart:no  Discharge Prescriptions with Chart:no    Information from the following report(s) SBAR, Kardex, OR Summary, Intake/Output, MAR, Med Rec Status and Cardiac Rhythm SB with BBB was reviewed with the receiving nurse. Opportunity for questions and clarification was provided. Is the patient on 02? NO        Is the patient on a monitor? NO    Is the nurse transporting with the patient? NO    Surgical Waiting Area notified of patient's transfer from PACU? YES      The following personal items collected during your admission accompanied patient upon transfer:   Dental Appliance:    Vision:    Hearing Aid:    Jewelry:    Clothing: Clothing: (bag of clothes returned to patient)  Other Valuables:  Other Valuables: Eyeglasses(returned to patient)  Valuables sent to safe: Personal Items Sent to Safe: valuables to security, receipts in chart

## 2020-05-15 NOTE — ANESTHESIA POSTPROCEDURE EVALUATION
Procedure(s):  REVISION RIGHT TOTAL KNEE REPLACEMENT.    spinal, MAC    Anesthesia Post Evaluation        Patient location during evaluation: PACU  Patient participation: complete - patient participated  Level of consciousness: awake and alert  Pain management: adequate  Airway patency: patent  Anesthetic complications: no  Cardiovascular status: acceptable  Respiratory status: acceptable  Hydration status: acceptable  Comments: I have seen and evaluated the patient and is ready for discharge. Marissa Mullins MD    Post anesthesia nausea and vomiting:  none      Vitals Value Taken Time   /79 5/15/2020  1:00 PM   Temp 36.6 °C (97.9 °F) 5/15/2020 12:45 PM   Pulse 63 5/15/2020  1:06 PM   Resp 13 5/15/2020  1:06 PM   SpO2 97 % 5/15/2020  1:06 PM   Vitals shown include unvalidated device data.

## 2020-05-15 NOTE — PROGRESS NOTES
Patient assessed for readiness to ambulate. Vital Signs  Level of Consciousness: Alert  Temp: 97.6 °F (36.4 °C)  Temp Source: Oral  Pulse (Heart Rate): 62  Heart Rate Source: Monitor  Cardiac Rhythm: Sinus bradycardia(same as preop per report from CRNA)  Resp Rate: 16  BP: 132/75  MAP (Monitor): 86  MAP (Calculated): 94  BP 1 Location: Left arm  BP 1 Method: Automatic  BP Patient Position: At rest  MEWS Score: 1. Patient ambulated with assistance of 1 nurses. Patient ambulated with gait belt and walker. Patient walked to bedside steps. Patient returned safely to bed.

## 2020-05-15 NOTE — PROGRESS NOTES
TRANSFER - IN REPORT:    Verbal report received from Trinh(name) on Ventura Leonard  being received from [facility]) for routine post - op      Report consisted of patients Situation, Background, Assessment and   Recommendations(SBAR). Information from the following report(s) SBAR, Kardex, Intake/Output and MAR was reviewed with the receiving nurse. Opportunity for questions and clarification was provided. Assessment completed upon patients arrival to unit and care assumed.

## 2020-05-15 NOTE — PERIOP NOTES
Three explants removed (femoral component, tibial component and poly insert) examined by Dr. Orin Torre and discarded per hospital policy.

## 2020-05-15 NOTE — PROGRESS NOTES
Consult received and chart reviewed. Patient is currently completely numb in both LEs with no active movement at all yet. He reports he does have a walker at home already. We will follow up tomorrow to assess mobility.     Pravin Charlton, PT

## 2020-05-15 NOTE — PROGRESS NOTES
Primary Nurse Poly Solitario RN and Pretty Coley RN performed a dual skin assessment on this patient No impairment noted  Miguel Angel score is 22

## 2020-05-15 NOTE — PROGRESS NOTES
Occupational Therapy  5/15/2020    OT referral received. Pt is currently POD 0 R TKR. Per PT note, pt is currently completely numb in both LEs with no active movement at all yet. Will follow up for OT assessment tomorrow. Thank you.  Radha Steiner MS, OTR/L

## 2020-05-15 NOTE — ROUTINE PROCESS
Occupational Therapy Orders received, chart review completed. Note patient POD #0 from REVISION RIGHT TOTAL KNEE REPLACEMENT. OT will follow up tomorrow for evaluation. Recommend OOB to chair three times a day for meals and self-completion of ADLs as able and medically stable. Thank you.

## 2020-05-16 LAB
ANION GAP SERPL CALC-SCNC: 5 MMOL/L (ref 5–15)
BUN SERPL-MCNC: 9 MG/DL (ref 6–20)
BUN/CREAT SERPL: 12 (ref 12–20)
CALCIUM SERPL-MCNC: 8.2 MG/DL (ref 8.5–10.1)
CHLORIDE SERPL-SCNC: 106 MMOL/L (ref 97–108)
CO2 SERPL-SCNC: 26 MMOL/L (ref 21–32)
CREAT SERPL-MCNC: 0.78 MG/DL (ref 0.7–1.3)
GLUCOSE SERPL-MCNC: 126 MG/DL (ref 65–100)
HGB BLD-MCNC: 11.9 G/DL (ref 12.1–17)
INR PPP: 1 (ref 0.9–1.1)
POTASSIUM SERPL-SCNC: 3.8 MMOL/L (ref 3.5–5.1)
PROTHROMBIN TIME: 10.6 SEC (ref 9–11.1)
SODIUM SERPL-SCNC: 137 MMOL/L (ref 136–145)

## 2020-05-16 PROCEDURE — 74011250637 HC RX REV CODE- 250/637: Performed by: ORTHOPAEDIC SURGERY

## 2020-05-16 PROCEDURE — 36415 COLL VENOUS BLD VENIPUNCTURE: CPT

## 2020-05-16 PROCEDURE — 80048 BASIC METABOLIC PNL TOTAL CA: CPT

## 2020-05-16 PROCEDURE — 97110 THERAPEUTIC EXERCISES: CPT | Performed by: PHYSICAL THERAPIST

## 2020-05-16 PROCEDURE — 85610 PROTHROMBIN TIME: CPT

## 2020-05-16 PROCEDURE — 97116 GAIT TRAINING THERAPY: CPT | Performed by: PHYSICAL THERAPIST

## 2020-05-16 PROCEDURE — 85018 HEMOGLOBIN: CPT

## 2020-05-16 PROCEDURE — 74011000250 HC RX REV CODE- 250: Performed by: ORTHOPAEDIC SURGERY

## 2020-05-16 PROCEDURE — 97161 PT EVAL LOW COMPLEX 20 MIN: CPT | Performed by: PHYSICAL THERAPIST

## 2020-05-16 PROCEDURE — 65270000029 HC RM PRIVATE

## 2020-05-16 PROCEDURE — 77030019905 HC CATH URETH INTMIT MDII -A

## 2020-05-16 PROCEDURE — 74011250636 HC RX REV CODE- 250/636: Performed by: ORTHOPAEDIC SURGERY

## 2020-05-16 RX ADMIN — SENNOSIDES AND DOCUSATE SODIUM 1 TABLET: 8.6; 5 TABLET ORAL at 09:00

## 2020-05-16 RX ADMIN — ACETAMINOPHEN 1000 MG: 500 TABLET, FILM COATED ORAL at 09:21

## 2020-05-16 RX ADMIN — CELECOXIB 200 MG: 200 CAPSULE ORAL at 17:19

## 2020-05-16 RX ADMIN — Medication 10 ML: at 15:46

## 2020-05-16 RX ADMIN — ACETAMINOPHEN 1000 MG: 500 TABLET, FILM COATED ORAL at 02:46

## 2020-05-16 RX ADMIN — SODIUM CHLORIDE 125 ML/HR: 900 INJECTION, SOLUTION INTRAVENOUS at 03:05

## 2020-05-16 RX ADMIN — POLYETHYLENE GLYCOL 3350 17 G: 17 POWDER, FOR SOLUTION ORAL at 09:21

## 2020-05-16 RX ADMIN — ACETAMINOPHEN 1000 MG: 500 TABLET, FILM COATED ORAL at 21:48

## 2020-05-16 RX ADMIN — FAMOTIDINE 20 MG: 20 TABLET ORAL at 17:19

## 2020-05-16 RX ADMIN — MONTELUKAST SODIUM 10 MG: 10 TABLET, FILM COATED ORAL at 21:48

## 2020-05-16 RX ADMIN — SENNOSIDES AND DOCUSATE SODIUM 1 TABLET: 8.6; 5 TABLET ORAL at 17:19

## 2020-05-16 RX ADMIN — ASPIRIN 325 MG: 325 TABLET, DELAYED RELEASE ORAL at 17:19

## 2020-05-16 RX ADMIN — FAMOTIDINE 20 MG: 20 TABLET ORAL at 09:21

## 2020-05-16 RX ADMIN — OXYCODONE 5 MG: 5 TABLET ORAL at 02:51

## 2020-05-16 RX ADMIN — CELECOXIB 200 MG: 200 CAPSULE ORAL at 09:21

## 2020-05-16 RX ADMIN — ACETAMINOPHEN 1000 MG: 500 TABLET, FILM COATED ORAL at 15:46

## 2020-05-16 RX ADMIN — CEFAZOLIN SODIUM 2 G: 10 INJECTION, POWDER, FOR SOLUTION INTRAVENOUS at 00:18

## 2020-05-16 RX ADMIN — ASPIRIN 325 MG: 325 TABLET, DELAYED RELEASE ORAL at 09:21

## 2020-05-16 NOTE — PROGRESS NOTES
Transition of Care- Patient will be discharged with [facility] ([phone]) Freedom of choice offered and the patient had [facility] from previous surgery.        Reason for Admission:   TKA                   RUR Score:   6%                  Plan for utilizing home health:  Yes- referral sent via All[employer] to  [facility] ([phone]) and they accepted the referral.    PCP: First and Last name:  Benita Johnston   Name of Practice: NA   Are you a current patient: Yes/No: Yes   Approximate date of last visit: past 6 months                    Current Advanced Directive/Advance Care Plan:  Not on File                         Transition of Care Plan:    Patient will be discharged home with [facility]

## 2020-05-16 NOTE — ROUTINE PROCESS
Bedside shift change report given to Terris Canavan ,RN (oncoming nurse) by Rao Ambrocio RN(offgoing nurse). Report given with SBAR.

## 2020-05-16 NOTE — PROGRESS NOTES
Problem: Mobility Impaired (Adult and Pediatric)  Goal: *Acute Goals and Plan of Care (Insert Text)  Description: FUNCTIONAL STATUS PRIOR TO ADMISSION: Patient was independent and active without use of DME.    HOME SUPPORT PRIOR TO ADMISSION: The patient lived with wife but did not require assist.    Physical Therapy Goals  Initiated 5/16/2020    1. Patient will move from supine to sit and sit to supine  in bed with modified independence within 4 days. 2. Patient will perform sit to stand with modified independence within 4 days. 3. Patient will ambulate with modified independence for 250 feet with the least restrictive device within 4 days. 4. Patient will ascend/descend 4 stairs with 1 handrail(s) with modified independence within 4 days. 5. Patient will perform home exercise program per protocol with modified independence within 4 days. 6. Patient will demonstrate AROM 0-90 degrees in operative joint within 4 days. 5/16/2020 1227 by Malvin Jacobsen, PT, DPT  Outcome: Progressing Towards Goal  5/16/2020 0900 by Malvin Jacobsen, PT, DPT  Outcome: Progressing Towards Goal  PHYSICAL THERAPY TREATMENT  Patient: Giancarlo Dial (30 y.o. male)  Date: 5/16/2020  Diagnosis: Painful total knee replacement, right (HCC) [T84.84XA, Z96.651]   <principal problem not specified>  Procedure(s) (LRB):  REVISION RIGHT TOTAL KNEE REPLACEMENT (Right) 1 Day Post-Op  Precautions: Fall, WBAT  Chart, physical therapy assessment, plan of care and goals were reviewed. ASSESSMENT  Patient continues with skilled PT services and is progressing towards goals. Patient needing supervision for bed mobility and CGA for transfers. Ambulated approx 175 feet with RW and CGA with no overt LOB but slow ra. Will plan to address stairs in the AM for prep to NH home. Anticipate he will clear after AM session.      Current Level of Function Impacting Discharge (mobility/balance): supervision for bed mobility and transfers, CGA for amb with RW    Other factors to consider for discharge: none         PLAN :  Patient continues to benefit from skilled intervention to address the above impairments. Continue treatment per established plan of care. to address goals. Recommendation for discharge: (in order for the patient to meet his/her long term goals)  Physical therapy at least 2 days/week in the home         IF patient discharges home will need the following DME: patient owns DME required for discharge       SUBJECTIVE:   Patient stated I'm doing better but it is hurting a little more.     OBJECTIVE DATA SUMMARY:   Critical Behavior:  Neurologic State: Alert, Appropriate for age  Orientation Level: Oriented X4, Appropriate for age  Cognition: Appropriate decision making, Appropriate for age attention/concentration, Appropriate safety awareness, Follows commands     Functional Mobility Training:  Bed Mobility:     Supine to Sit: Supervision  Sit to Supine: Supervision  Scooting: Supervision        Transfers:  Sit to Stand: Contact guard assistance  Stand to Sit: Contact guard assistance                             Balance:  Sitting: Intact  Standing: Impaired  Standing - Static: Constant support;Good  Standing - Dynamic : Constant support;Good  Ambulation/Gait Training:  Distance (ft): 175 Feet (ft)  Assistive Device: Gait belt;Walker, rolling  Ambulation - Level of Assistance: Contact guard assistance     Gait Description (WDL): Exceptions to WDL  Gait Abnormalities: Antalgic;Decreased step clearance        Base of Support: Center of gravity altered; Widened     Speed/Maira: Pace decreased (<100 feet/min)  Step Length: Left shortened;Right shortened         Therapeutic Exercises:   Reviewed:  ankle pumps, quad sets, heel slides, hip abd  Pain Rating:  Reports pain but does not rate    Activity Tolerance:   Good and requires rest breaks  Please refer to the flowsheet for vital signs taken during this treatment.     After treatment patient left in no apparent distress:   Sitting in chair and Call bell within reach    COMMUNICATION/COLLABORATION:   The patients plan of care was discussed with: Physical therapist, Occupational therapist, and Registered nurse.      Carolyne Verdugo PT, DPT   Time Calculation: 18 mins

## 2020-05-16 NOTE — PROGRESS NOTES
POD#1 R revision TKA  Pain controlled    AFVSS  Dressing c/d/i  NVI    Labs stable    PT/OT  DVT proph  Pain control  Home tomorrow?

## 2020-05-16 NOTE — PROGRESS NOTES
Problem: Falls - Risk of  Goal: *Absence of Falls  Description: Document Mikaela Mendoza Fall Risk and appropriate interventions in the flowsheet.   Outcome: Progressing Towards Goal  Note: Fall Risk Interventions:  Mobility Interventions: Patient to call before getting OOB, PT Consult for mobility concerns, PT Consult for assist device competence, OT consult for ADLs    Medication Interventions: Patient to call before getting OOB, Teach patient to arise slowly, Evaluate medications/consider consulting pharmacy, Utilize gait belt for transfers/ambulation    Elimination Interventions: Call light in reach, Patient to call for help with toileting needs, Stay With Me (per policy)

## 2020-05-16 NOTE — PROGRESS NOTES
Problem: Mobility Impaired (Adult and Pediatric)  Goal: *Acute Goals and Plan of Care (Insert Text)  Description: FUNCTIONAL STATUS PRIOR TO ADMISSION: Patient was independent and active without use of DME.    HOME SUPPORT PRIOR TO ADMISSION: The patient lived with wife but did not require assist.    Physical Therapy Goals  Initiated 5/16/2020    1. Patient will move from supine to sit and sit to supine  in bed with modified independence within 4 days. 2. Patient will perform sit to stand with modified independence within 4 days. 3. Patient will ambulate with modified independence for 250 feet with the least restrictive device within 4 days. 4. Patient will ascend/descend 4 stairs with 1 handrail(s) with modified independence within 4 days. 5. Patient will perform home exercise program per protocol with modified independence within 4 days. 6. Patient will demonstrate AROM 0-90 degrees in operative joint within 4 days. Outcome: Progressing Towards Goal   PHYSICAL THERAPY EVALUATION  Patient: Jeremy Munguia (76 y.o. male)  Date: 5/16/2020  Primary Diagnosis: Painful total knee replacement, right (HCC) [T84.84XA, Z96.651]  Procedure(s) (LRB):  REVISION RIGHT TOTAL KNEE REPLACEMENT (Right) 1 Day Post-Op   Precautions:   Fall, WBAT      ASSESSMENT  Based on the objective data described below, the patient presents with decreased functional mobility from baseline level of function. Patient currently limited by pain but otherwise moving well. Currently needing supervision for bed mobility and CGA for transfers and gait. No overt difficulty noted with ambulation but has increased pain with increased distance. Patient's preference is to DC home tmrw. Current Level of Function Impacting Discharge (mobility/balance): CGA for transfers and gait      Other factors to consider for discharge: none     Patient will benefit from skilled therapy intervention to address the above noted impairments. PLAN :  Recommendations and Planned Interventions: bed mobility training, transfer training, gait training, therapeutic exercises, patient and family training/education, and therapeutic activities      Frequency/Duration: Patient will be followed by physical therapy:  twice daily to address goals. Recommendation for discharge: (in order for the patient to meet his/her long term goals)  Physical therapy at least 2 days/week in the home       IF patient discharges home will need the following DME: patient owns DME required for discharge         SUBJECTIVE:   Patient stated I went home too early the last time I had this done.     OBJECTIVE DATA SUMMARY:   HISTORY:    Past Medical History:   Diagnosis Date    Arthritis     KNEES, LEFT HAND-OSTEO    Chronic pain     Environmental and seasonal allergies     LBBB (left bundle branch block) 10/16/2018    Nausea & vomiting      Past Surgical History:   Procedure Laterality Date    HX CERVICAL FUSION  2006    HX GI      COLONOSCOPY    HX LUMBAR LAMINECTOMY  1993    HX ORTHOPAEDIC Left 2009    HAND SURGERY     HX VASECTOMY  1983       Personal factors and/or comorbidities impacting plan of care:     Home Situation  Home Environment: Private residence  # Steps to Enter: 3  Rails to Enter: Yes  Hand Rails : Bilateral  One/Two Story Residence: Two story  Living Alone: No  Support Systems: Spouse/Significant Other/Partner  Patient Expects to be Discharged to[de-identified] Private residence  Current DME Used/Available at Home: [employer]    EXAMINATION/PRESENTATION/DECISION MAKING:   Critical Behavior:  Neurologic State: Alert  Orientation Level: Oriented X4  Cognition: Appropriate decision making, Appropriate for age attention/concentration, Appropriate safety awareness, Follows commands     Hearing:   Auditory  Auditory Impairment: None    Range Of Motion:  AROM: Generally decreased, functional                       Strength:    Strength: Generally decreased, functional Functional Mobility:  Bed Mobility:     Supine to Sit: Supervision  Sit to Supine: Supervision  Scooting: Supervision  Transfers:  Sit to Stand: Contact guard assistance  Stand to Sit: Contact guard assistance                       Balance:   Sitting: Intact  Standing: Impaired  Standing - Static: Constant support;Good  Standing - Dynamic : Constant support; Fair  Ambulation/Gait Training:  Distance (ft): 120 Feet (ft)  Assistive Device: Gait belt;Walker, rolling  Ambulation - Level of Assistance: Contact guard assistance     Gait Description (WDL): Exceptions to WDL  Gait Abnormalities: Antalgic;Decreased step clearance        Base of Support: Center of gravity altered; Widened     Speed/Maira: Pace decreased (<100 feet/min); Shuffled; Slow  Step Length: Left shortened;Right shortened            Therapeutic Exercises:   Supine: Ankle pumps  Heel slides  Quad sets  HS sets    Pain Rating:  Reports pain but does not rate    Activity Tolerance:   Good and requires rest breaks  Please refer to the flowsheet for vital signs taken during this treatment. After treatment patient left in no apparent distress:   Sitting in chair and Call bell within reach    COMMUNICATION/EDUCATION:   The patients plan of care was discussed with: Physical therapist, Occupational therapist, and Registered nurse. Fall prevention education was provided and the patient/caregiver indicated understanding., Patient/family have participated as able in goal setting and plan of care. , and Patient/family agree to work toward stated goals and plan of care.     Thank you for this referral.  Cirilo Cobian, PT, DPT   Time Calculation: 27 mins

## 2020-05-17 VITALS
BODY MASS INDEX: 25.6 KG/M2 | DIASTOLIC BLOOD PRESSURE: 81 MMHG | WEIGHT: 189 LBS | HEIGHT: 72 IN | HEART RATE: 70 BPM | RESPIRATION RATE: 16 BRPM | TEMPERATURE: 98.3 F | OXYGEN SATURATION: 96 % | SYSTOLIC BLOOD PRESSURE: 139 MMHG

## 2020-05-17 LAB
INR PPP: 1 (ref 0.9–1.1)
PROTHROMBIN TIME: 10.2 SEC (ref 9–11.1)

## 2020-05-17 PROCEDURE — 97116 GAIT TRAINING THERAPY: CPT

## 2020-05-17 PROCEDURE — 74011250637 HC RX REV CODE- 250/637: Performed by: ORTHOPAEDIC SURGERY

## 2020-05-17 PROCEDURE — 85610 PROTHROMBIN TIME: CPT

## 2020-05-17 PROCEDURE — 77030012893

## 2020-05-17 PROCEDURE — 36415 COLL VENOUS BLD VENIPUNCTURE: CPT

## 2020-05-17 RX ORDER — OXYCODONE HYDROCHLORIDE 5 MG/1
5 TABLET ORAL
Qty: 42 TAB | Refills: 0 | Status: SHIPPED
Start: 2020-05-17 | End: 2020-05-20

## 2020-05-17 RX ADMIN — ASPIRIN 325 MG: 325 TABLET, DELAYED RELEASE ORAL at 08:43

## 2020-05-17 RX ADMIN — CELECOXIB 200 MG: 200 CAPSULE ORAL at 08:43

## 2020-05-17 RX ADMIN — ACETAMINOPHEN 1000 MG: 500 TABLET, FILM COATED ORAL at 08:43

## 2020-05-17 NOTE — OP NOTES
Total Knee Operative Report      Patient: Michaela Medrano MRN: [id number]  SSN: [id number]    YOB: 1960  Age: 60 y.o. Sex: male      DATE OF SURGERY:  5/15/2020    Indications: This is a 60 yrs male who presents with  right knee  Painful knee replacement with persistent effusion. Metal sensitivity testing showed a nickel allergy. .   The patient was admitted for surgery as conservative measures have failed. Date of Surgery: 5/15/2020     Preoperative Diagnosis: 1. Nickel Allergy      2. PAINFUL RIGHT TOTAL KNEE REPLACEMENT        Postoperative Diagnosis: 1. Nickel Allergy      2. PAINFUL RIGHT TOTAL KNEE REPLACEMENT    Surgeon: Eufemia Pope MD (Jody)    1st Assistant: Pretty Christopher PA-C    2ndAssistant: Leatha So DO    Anesthesia: Spinal    Procedure: Procedure(s):  REVISION RIGHT TOTAL KNEE REPLACEMENT      Procedure Details:  After the procedure had been explained to the patient including the risks, benefits and possible complications, Michaela Medrano signed the informed consent. Michaela Medrano was then brought to the operating room and positioned on the operating table in a supine position and was anethestized with anesthesia. A cardoso catheter was placed preoperatively and IV  Ancef 2 GM was administered. A pneumatic tourniquet was placed about the limb and the right leg was prepped and draped in the usual sterile manner. The tourniquet was inflated. An anterior longitudinal incision was accomplished just medial to the tibial tubercle through the old incision and extending approximal 6 centimeters proximal to the superior pole of the patella. The retinaculum was exposed. A medial parapatellar capsular incision was performed. The medial capsular flap was elevated around to the insertion of the semimembranous tendon. Significant amount of synovitis was seen  and a complete synovectomy was performed.    This synovitis did not seem his inflammatory as much As proliferative. A significant effusion was drained. Thorough release of tissue was done circumferentially around the prosthesis particularly focusing on the tissue around the patellar tendon. The patella was everted and the knee flexed and externally rotated. Care was taken to protect the tibial tubercle and a pin was inserted in the tendon to protect it. The polyethylene was carefully removed. The implants were then evaluated and both the femur and tibia were found to be well fixed. Using flexible and rigid osteotomes circumferential release of the prosthesis was done. We released the femur and tibia were removed with minimal bone loss. All excess cement was also carefully removed. Attention was then turned to the  Tibia 1st.  An intramedullary Reamer was placed and reaming was done to a level of 18. Broaching was then done of the metaphyseal area to a size medium proximal sleeve. The  trysleeve was then inserted  And the revision cut of the proximal femur was made off of the trial.  The trial tibia with a stem was also placed and found to fit well. Attention was then turned to the femur. The Knee was flexed to 90 degrees and an intramedullary canal entry hole was drilled just anterior to the   Expected PCL insertion on the femur. The intramedullary fauzia was inserted and reaming was done to a level of 20. Reamer was left in place and the cutting guide used this to reference for a resection of  0 mm off of the distal femur in approx 6 degrees of valgus. The flexion and extension gaps were assessed. The sizing guide for the femoral component was then placed and a size  79 was chosen. The guide was set in the appropriate amount of external rotation to the epicondylar axis to create an equal flexion gap. The appropriate cutting blocks were then utilized to perform the anterior,  Posterior, and  Chamfer freshen cuts  with appropriate lateral translation accomplished.        The tibia was sized to a  77 component. A preliminary range of motion was accomplished with the above size trial components. A  12 millimeter polyethylene insert allowed the patient to obtain full extension as well as appropriate flexion. The patient's ligaments were stable in flexion and extension to medial and lateral stressing and the alignment was through the appropriate mechanical axis. All trial components were removed and the cut surfaces prepared for cementing with irrigation and debridement of the bone interstices. No augmentation was utilized. Two package of cement were mixed. The tibial cone was impacted into place 1st.  Cement was then applied and the permanent components cemented into place. The femoral and tibial components were pressurized in full extension as well as 70 degrees of flexion. The stems were attached to both the femoral and tibial components. Excess cement was using a curette. A combination of Exparel, MSO4, Marcaine, Steroid and Saline was then injected into the entirety of the capsule to assist with post-op pain relief. Once the cement was hardened, the knee was copiously irrigated. Retrialing was done and a size  79 tibial polyethylene component  of  12 mm thickness was chosen. Cindi Riley knee was placed through range of motion and noted to be stable as mentioned above with the trail components. The wound was dry, therefore no drain was used. The capsular layer was closed using a #1 ethibond suture and stratafix suture, while subcutaneous layers were closed using 2-0 Dexon interrupted sutures. Finally the skin was closed using Skin staples, which were applied in occlusive fashion and sterile bandage applied. An Iceman cryo pad was applied on the operative leg. The pneumatic tourniquet was deflated. Sponge count and needle counts were correct. Cindi Riley left the operating room and went to the PACU in Stable Condition.     Vinita Berger PA-C was of vital assistance during the performance of the procedure. She was essential for positioning the knee for the various parts of the procedure and assisting with the exposure of the knee, protection of vital structures and the closure of the knee. Tourniquet Time:   Total Tourniquet Time Documented:  Thigh (Right) - 120 minutes  Thigh (Right) - 100 minutes  Total: Thigh (Right) - 220 minutes       Implants:   Implant Name Type Inv.  Item Serial No.  Lot No. LRB No. Used Action   CEMENT BNE GENTAMC MV [id number] -- SM[employer]URANCE - SNA  CEMENT BNE GENTAMC MV 40GM -- SMARTSET ENDURANCE NA [facility] [employer] [id number] Right 1 Implanted   CEMENT BNE GENTAMC MV 40GM -- SMARTSET ENDURANCE - SNA  CEMENT BNE GENTAMC MV 40GM -- SMARTSET ENDURANCE NA [facility] [employer] [id number] Right 2 Implanted   STEM FEM REV SPLND [id number] --  - SNA  STEM FEM REV SPLND [id number] --  NA [employer] [id number] Right 1 Implanted   TRAY TIB OFFSET 79MM COCR --  - SNA  TRAY TIB OFFSET 79MM COCR --  NA [employer] [id number] Right 1 Implanted   Metal Alternative Femoral with screw ION implant 70mm   NA [employer] [id number] Right 1 Implanted   STEM KNE GB [id number] --  - SNA  STEM KNE GB [id number] --  NA [employer] [id number] Right 1 Implanted   Persona Revision Trabecular MetalTibial Central Cone   NA [employer] [id number] Right 1 Implanted   SSK Femoral Screw   NA [employer] [id number] Right 1 Implanted   Biomet Knee System Offset Tibial Tray Adaptor Neutral Size    NA [employer] [id number] Right 1 Implanted   Vanguard Knee System Constrained PS Tibial Bearing CDM ArCom 12 mm, 79/83 mm   NA [employer] [id number] Right 1 Implanted        Condition: Stable    Findings: DJD    Estimated Blood Loss:     100 cc         Drains: None    Specimens:   ID Type Source Tests Collected by Time Destination   1 : Right knee joint fluid Joint Fluid Joint, Knee AEROBIC/ANAEROBIC CULTURE VerEufemia Leyva MD (Jody) 5/15/2020 4789 Microbiology         Complications:  None; patient tolerated the procedure well    Signed By: Eufemia Lara MD (5/15/2020 at 7:44 AM)

## 2020-05-17 NOTE — PROGRESS NOTES
Bedside and Verbal shift change report given to [facility] (oncoming nurse) by Zuri Esqueda (offgoing nurse). Report included the following information SBAR, Kardex and MAR.

## 2020-05-17 NOTE — DISCHARGE SUMMARY
Total Knee Discharge Summary  Patient ID:  Brenda Bell  [id number]  29 y.o.  1960    Admit date: 5/15/2020    Discharge date and time: 5/17/2020 11:30 AM     Admitting Physician: Eufemia Jacome MD (Jody)     Discharge Physician: Rashaun Kovacs. Kaylee Claude, MD    Admission Diagnoses: Painful total knee replacement, right ([address] [W92.48KI, [address]    Discharge Diagnoses: Active Problems:    Painful total knee replacement, right ([address] (5/15/2020)      Nickel allergy (5/15/2020)      Overview: Had right TKA 11/2/2018- continued problems with pain and swelling postop. Metal panel obtained revealed severe nickel sensitivity reaction        Date of Surgery: 5/15/2020     Procedure:  Procedure(s):  REVISION RIGHT TOTAL KNEE REPLACEMENT    Surgeon: Julious P. Kaylee Claude, MD    DVT Prophylaxis: ASA     Postoperative transfusions:     none Banked PRBCs     Post Op complications: none    Hemoglobin at discharge:  Lab Results   Component Value Date/Time    HGB 11.9 (L) 05/16/2020 02:59 AM    INR 1.0 05/17/2020 02:26 AM       HOSPITAL COURSE: Brenda Bell is a pleasant 60 y.o. long standing patient with advanced osteoarthritis. The patient failed all conservative management. Therefore, Dr. Loza Nail and the patient decided the most appropriate plan of care is to proceed with a total knee arthroplasty, to be performed at [facility]. All preoperative clearance was done prior to surgery. The patient's complete history and physical, laboratory data and physical exam is well documented in hospital chart. Pre operative antiobiotics, Celebrex, transanamic acid administered prior to surgery. Brenda Bell was admitted on 5/15/2020  and underwent successful Procedure(s):  REVISION RIGHT TOTAL KNEE REPLACEMENT. There were no complications intraoperatively and the patient was transferred to the orthopaedic floor in stable condition.  Physical therapy and occupational therapy initiated their evaluation and treatment and continued to follow the patient until the patient was discharged. For pain control, the procedure was performend under a spinal block and a femoral block, and intra operative encapsulated cocktail administered intra-articularly into the capsule and soft tissues. Post operative  oxycodone was utilized as well as Celebrex with excellent pain relief and was well tolerated. DVT prophylaxis was initiated twith 325 mg of ASA  for two days and then starting  3 in 25 mg of aspirin twice daily. Compression stockings and bilateral foot pumps were all started post-operatively. The incision site remained clean, dry, and intact. The patient remained neurovascularly intact. Physical Therapy started on the day following surgery and progressed to independent ambulation with the aid of a walker. At the time of discharge on POD #2, the patient was able to ambulate safely, go up and down stairs and had an understanding of the explicit discharge precautions and instructions following surgery. At the time of discharge the wound appears to be healing without any evidence of infection. At the time of discharge, Ramu Cortes was able to go up and down stairs and had understanding of precautions needed following surgery. Discharged to: Home     Discharge instructions:   -Anticoagulate with  325 mg  twice daily.   -Resume pre hospital diet with normal changes for coumadin.   -Resume home medications per medication reconciliation form. No NSAIDs or aspirin products while on anticoagulation. Prescriptions for   Oxycodone 5 mg pain medication and  325 mg of aspirin were provided to the patient.   -Ambulate with an assisted device as instructed by PT/OT prior to discharge.   -Patient is to have Home Health services provided post operatively to include skilled nursing and physical therapy.  Patient is to work hard on full extension and full flexion throughout the day at home.   -First follow-up appointment to be scheduled in approximately 3 weeks. Patient should call the office to confirm apptointment. -Explicit discharge instructions were provided to the patient.       Signed:  Eufemia Jose MD (Jody)  5/17/2020  6:44 PM

## 2020-05-17 NOTE — PROGRESS NOTES
POD#2 R revision TKA  Pain controlled.   Davon Sidhu for discharge    AFVSS  Dressing saturated with blood - changed with new Savorfull  NVI    Labs stable    PT/OT  DVT proph  Pain control  Home today

## 2020-05-17 NOTE — PROGRESS NOTES
Problem: Mobility Impaired (Adult and Pediatric)  Goal: *Acute Goals and Plan of Care (Insert Text)  Description: FUNCTIONAL STATUS PRIOR TO ADMISSION: Patient was independent and active without use of DME.    HOME SUPPORT PRIOR TO ADMISSION: The patient lived with wife but did not require assist.    Physical Therapy Goals  Initiated 5/16/2020    1. Patient will move from supine to sit and sit to supine  in bed with modified independence within 4 days. 2. Patient will perform sit to stand with modified independence within 4 days. 3. Patient will ambulate with modified independence for 250 feet with the least restrictive device within 4 days. 4. Patient will ascend/descend 4 stairs with 1 handrail(s) with modified independence within 4 days. 5. Patient will perform home exercise program per protocol with modified independence within 4 days. 6. Patient will demonstrate AROM 0-90 degrees in operative joint within 4 days. Outcome: Progressing Towards Goal    PHYSICAL THERAPY TREATMENT  Patient: Zachery Duff (50 y.o. male)  Date: 5/17/2020  Diagnosis: Painful total knee replacement, right (HCC) [T84.84XA, Z96.651]   <principal problem not specified>  Procedure(s) (LRB):  REVISION RIGHT TOTAL KNEE REPLACEMENT (Right) 2 Days Post-Op  Precautions: Fall, WBAT  Chart, physical therapy assessment, plan of care and goals were reviewed. ASSESSMENT  Patient continues with skilled PT services and is progressing towards goals. Pt was able increase activity tolerance and decrease assist level. Pt was able to perform steps for home access. Pt owns rolling walker. Noted drainage (old) under bandage but did not increase post gait. Pt expressing no concerns about discharge at this time. Pt cleared for discharge from PT standpoint.       Current Level of Function Impacting Discharge (mobility/balance): supervision     Other factors to consider for discharge: drainage         PLAN :  Patient continues to benefit from skilled intervention to address the above impairments. Continue treatment per established plan of care. to address goals. Recommendation for discharge: (in order for the patient to meet his/her long term goals)  Physical therapy at least 2 days/week in the home     This discharge recommendation:  Has not yet been discussed the attending provider and/or case management    IF patient discharges home will need the following DME: patient owns DME required for discharge       SUBJECTIVE:   Patient stated I haven't had much pain.     OBJECTIVE DATA SUMMARY:   Critical Behavior:  Neurologic State: Alert  Orientation Level: Oriented X4  Cognition: Appropriate decision making, Appropriate safety awareness, Appropriate for age attention/concentration       Range of Motion:                            Functional Mobility Training:  Bed Mobility:     Supine to Sit: Modified independent  Sit to Supine: Modified independent           Transfers:  Sit to Stand: Supervision  Stand to Sit: Supervision                             Balance:  Sitting: Intact  Standing: Intact; With support  Ambulation/Gait Training:  Distance (ft): 300 Feet (ft)  Assistive Device: Gait belt;Walker, rolling  Ambulation - Level of Assistance: Supervision        Gait Abnormalities: Antalgic;Decreased step clearance        Base of Support: Widened     Speed/Maira: Pace decreased (<100 feet/min)                      Stairs:  Number of Stairs Trained: 4  Stairs - Level of Assistance: Stand-by assistance   Rail Use: Both    Therapeutic Exercises:     EXERCISE   Sets   Reps   Active Active Assist   Passive Self ROM   Comments   Ankle Pumps   []                                        [x]                                        []                                        []                                           Quad Sets   []                                        [x]                                        []                                        [] Hamstring Sets   []                                        []                                        []                                        []                                           Short Arc Quads   []                                        []                                        []                                        []                                           Knee Extension Stretch     []                                          []                                          []                                          []                                           Heel Slides   []                                        [x]                                        []                                        []                                           Long Arc Quads   []                                        []                                        []                                        []                                           Knee Flexion Stretch   []                                        []                                        []                                        []                                           Straight Leg Raises   []                                        []                                        [x]                                        []                                               Pain Rating:  tolerable    Activity Tolerance:   Improving   Please refer to the flowsheet for vital signs taken during this treatment. After treatment patient left in no apparent distress:   Supine in bed and Call bell within reach    COMMUNICATION/COLLABORATION:   The patients plan of care was discussed with: Registered nurse.      Krys Manriquez PTA   Time Calculation: 16 mins

## 2020-05-17 NOTE — PROGRESS NOTES
I have reviewed discharge instructions with the patient. The patient verbalized understanding. Patient has all personal belongings (cell phone, clothes, ice packs, luggage, binder, and discharge instructions).

## 2020-05-17 NOTE — DISCHARGE INSTRUCTIONS
After Hospital Care Plan:  Discharge Instructions Knee Replacement-Dr. Oz Pang      Patient Name: Cindi Riley  Date of procedure: 5/15/2020   Procedure: Procedure(s):  REVISION RIGHT TOTAL KNEE REPLACEMENT  Surgeon: Kat Can) and Role:     Eufemia Cobos MD (Jody) - Primary     * Daryle Krabbe, DO - Fellow    PCP: Ermelinda Payne MD  Date of discharge: No discharge date for patient encounter. Diet  Regular diet or usual diet as at home. Drink plenty of fluids. Eat foods high in fiber and protein and low in fat. Avoid alcoholic beverages. Avoid smoking. Activities  You are going home a well person, so be as active as possible. Walk with your walker or crutches weight bearing as tolerated-wean as tolerated. Avoid low chairs and slippery surfaces. Avoid twisting your knee. Do not sit longer than 45 minutes at a time. During the daytime, get up every hour and take a brief walk. Exercises  Perform your exercise routine 3 times a day as instructed by the physical therapist.  Gradually increase the repetitions of the exercises. You may place an ice bag on your knee for 15-20 minutes after exercising. You should walk daily, each time lengthening your walking distance as your strength improves. Be sure to work on getting your knee out all the way straight. Do not place a pillow under your knee when resting. Medications  Take Aspirin 325mg 1 tablet twice a day for 4 weeks. Take a multivitamin with iron once a day for a month. Take a stool softener daily (such as Senokot-S or Colace) to prevent constipation while you are taking iron and pain medication. If constipation occurs, take a laxative (such as Dulcolax tablets, Milk of Magnesia, or suppository). Take pain medication with food. You will find that you will decrease the amount you use as your pain lessens.       Oxycodone 5mg/ Tylenol 325mg 1-2 tabs every 4-6 hours as needed for pain    Incision Care  You will have a waterproof dressing on the knee called Aquacel and it is OK to shower with the dressing in place. The Aquacel dressing will be removed 1 week after surgery. Ok to leave open to the air as long as it is not draining      If you have staples ; they will be removed 2 weeks after surgery by  your home health nurse  If there are steri-strips ; please leave them in place until they fall off. Cover your wound if you are having any drainage. Wear your elastic stockings for 4 weeks. You may remove them for approximately 1 hour when showering       Follow-up Office Visit  Post op appointment is with Mary Rivera PA-C ( Dr. Jennifer BOBO)in approximately 2 weeks. . Please call the office if you need to change your follow -up appointment ([phone])    Reasons to call the Doctor  1. Increased redness, swelling or drainage from you incision site. 2.  Temperature consistently greater than 101 degrees. 3.  Increased pain or unrelieved pain. 4.  Calf or chest pain      Home Health/Home therapy  Physical Therapy-routine exercises, ROM,  gait training quad strengthening. 3 times in 10 day period   Remove Aquacel Dressing 1 week post op. (leave steri-strips in place iuntil they come off. Out Patient Physical therapy  You should expect to begin outpatient physical therapy approximately 2 -2 1/2 weeks after surgery. This should already be set up prior to your surgery. If it is not already scheduled, please call the office and speak with one of my assistants about setting up outpatient physical therapy. Other instructions  Do not take more than 4 Grams of Tylenol in 24 hours. Many pain medications contain Tylenol. Percocet contains 325mg of Tylenol per tablet; Lortab contains 500mg of Tylenol per tablet, Norco contains 325 mg of Tylenol per tablet.     Tylenol usage:  325 mg tablets DO NOT take more than 12 tablets in 24 hours                            500mg tablets DO NOT take more than 8 tablets in 24 hours

## 2020-05-29 LAB
BACTERIA SPEC CULT: NORMAL
GRAM STN SPEC: NORMAL
SERVICE CMNT-IMP: NORMAL
SERVICE CMNT-IMP: NORMAL

## 2020-06-16 NOTE — DISCHARGE SUMMARY
TOTAL KNEE REPLACEMENT DISCHARGE SUMMARY - ASA  Patient ID:  Brenda Bell  [id number]  17 y.o.  1960    Admit date: 5/15/2020    Discharge date and time: 5/17/2020 11:30 AM     Admitting Physician: Eufemia Jacome MD (Jody)     Discharge Physician: Rashaun Kovacs. 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 Metal panel obtained revealed severe nickel sensitivity reaction        Date of Surgery: 5/15/2020     Procedure:  Procedure(s):  REVISION RIGHT TOTAL KNEE REPLACEMENT    Surgeon: Julious P. Kaylee Claude, MD   First Assistant :  Kel Edge PA-C   DVT Prophylaxis: ASA     Postoperative transfusions:     none Banked PRBCs     Post Op complications: none    Hemoglobin at discharge:  Lab Results   Component Value Date/Time    HGB 11.9 (L) 05/16/2020 02:59 AM    INR 1.0 05/17/2020 02:26 AM       HOSPITAL COURSE: Brenda Bell is a pleasant 60 y.o. long standing patient with advanced osteoarthritis. The patient failed all conservative management. Therefore, Dr. Denia Mccord and the patient decided the most appropriate plan of care is to proceed with a total knee arthroplasty, to be performed at [facility]. All preoperative clearance was done prior to surgery. The patient's complete history and physical, laboratory data and physical exam is well documented in hospital chart. Pre operative antiobiotics, Celebrex, transanamic acid administered prior to surgery. Brenda Bell was admitted on 5/15/2020  and underwent successful Procedure(s):  REVISION RIGHT TOTAL KNEE REPLACEMENT. There were no complications intraoperatively and the patient was transferred to the orthopaedic floor in stable condition. Physical therapy and occupational therapy initiated their evaluation and treatment and continued to follow the patient until the patient was discharged. For pain control, the procedure was performend under a spinal block and a femoral block, and intra operative encapsulated cocktail administered intra-articularly into the capsule and soft tissues. Post operative  oxycodone was utilized as well as Celebrex with excellent pain relief and was well tolerated. DVT prophylaxis was initiated twith 325 mg of ASA  AND WILL BE CONTINUED FOR 4 WEEKS. Compression stockings and bilateral foot pumps were all started post-operatively. The incision site remained clean, dry, and intact. The patient remained neurovascularly intact. Physical Therapy started on the day following surgery and progressed to independent ambulation with the aid of a walker. At the time of discharge on POD #1, the patient was able to ambulate safely, go up and down stairs and had an understanding of the explicit discharge precautions and instructions following surgery. At the time of discharge the wound appears to be healing without any evidence of infection. At the time of discharge, Jonna Kawasaki was able to go up and down stairs and had understanding of precautions needed following surgery. Discharged to: Home     Discharge instructions:   -Anticoagulate with Aspirin 325mg bid x 4 weeks   -Resume pre hospital diet with normal changes for coumadin.   -Resume home medications per medication reconciliation form. No NSAIDs or aspirin products while on anticoagulation. Prescriptions for oxycodone pain medication .   -Ambulate with an assisted device as instructed by PT/OT prior to discharge.   -Patient is to have [address] [place] [address] services provided post operatively to include skilled nursing and physical therapy.  Patient is to work hard on full extension and full flexion throughout the day at home.   -First follow-up appointment to be scheduled in approximately 2 weeks. Patient should call the office to confirm apptointment. -Explicit discharge instructions were provided to the patient.       Signed:  Haleigh Portillo PA-C  6/16/2020  4:37 PM

## 2022-01-27 ENCOUNTER — TRANSCRIBE ORDER (OUTPATIENT)
Dept: REGISTRATION | Age: 62
End: 2022-01-27

## 2022-01-27 DIAGNOSIS — Z01.812 PRE-PROCEDURE LAB EXAM: Primary | ICD-10-CM

## 2022-02-04 ENCOUNTER — HOSPITAL ENCOUNTER (OUTPATIENT)
Dept: PREADMISSION TESTING | Age: 62
Discharge: HOME | End: 2022-02-04
Payer: COMMERCIAL

## 2022-02-04 VITALS
DIASTOLIC BLOOD PRESSURE: 85 MMHG | HEIGHT: 73 IN | WEIGHT: 181 LBS | SYSTOLIC BLOOD PRESSURE: 158 MMHG | BODY MASS INDEX: 23.99 KG/M2 | RESPIRATION RATE: 16 BRPM | HEART RATE: 70 BPM | TEMPERATURE: 98.2 F

## 2022-02-04 LAB
ABO + RH BLD: NORMAL
ANION GAP SERPL CALC-SCNC: 5 MMOL/L (ref 5–15)
APPEARANCE UR: CLEAR
BACTERIA URNS QL MICRO: NEGATIVE /HPF
BILIRUB UR QL: NEGATIVE
BLOOD GROUP ANTIBODIES SERPL: NORMAL
BUN SERPL-MCNC: 12 MG/DL (ref 6–20)
BUN/CREAT SERPL: 17 (ref 12–20)
CALCIUM SERPL-MCNC: 8.8 MG/DL (ref 8.5–10.1)
CHLORIDE SERPL-SCNC: 108 MMOL/L (ref 97–108)
CO2 SERPL-SCNC: 25 MMOL/L (ref 21–32)
COLOR UR: NORMAL
CREAT SERPL-MCNC: 0.69 MG/DL (ref 0.7–1.3)
EPITH CASTS URNS QL MICRO: NORMAL /LPF
ERYTHROCYTE [DISTWIDTH] IN BLOOD BY AUTOMATED COUNT: 12.4 % (ref 11.5–14.5)
EST. AVERAGE GLUCOSE BLD GHB EST-MCNC: 114 MG/DL
GLUCOSE SERPL-MCNC: 103 MG/DL (ref 65–100)
GLUCOSE UR STRIP.AUTO-MCNC: NEGATIVE MG/DL
HBA1C MFR BLD: 5.6 % (ref 4–5.6)
HCT VFR BLD AUTO: 40 % (ref 36.6–50.3)
HGB BLD-MCNC: 13.5 G/DL (ref 12.1–17)
HGB UR QL STRIP: NEGATIVE
HYALINE CASTS URNS QL MICRO: NORMAL /LPF (ref 0–5)
INR PPP: 1 (ref 0.9–1.1)
KETONES UR QL STRIP.AUTO: NEGATIVE MG/DL
LEUKOCYTE ESTERASE UR QL STRIP.AUTO: NEGATIVE
MCH RBC QN AUTO: 34.4 PG (ref 26–34)
MCHC RBC AUTO-ENTMCNC: 33.8 G/DL (ref 30–36.5)
MCV RBC AUTO: 101.8 FL (ref 80–99)
NITRITE UR QL STRIP.AUTO: NEGATIVE
NRBC # BLD: 0 K/UL (ref 0–0.01)
NRBC BLD-RTO: 0 PER 100 WBC
PH UR STRIP: 6.5 [PH] (ref 5–8)
PLATELET # BLD AUTO: 153 K/UL (ref 150–400)
PMV BLD AUTO: 10.1 FL (ref 8.9–12.9)
POTASSIUM SERPL-SCNC: 4.1 MMOL/L (ref 3.5–5.1)
PROT UR STRIP-MCNC: NEGATIVE MG/DL
PROTHROMBIN TIME: 10 SEC (ref 9–11.1)
RBC # BLD AUTO: 3.93 M/UL (ref 4.1–5.7)
RBC #/AREA URNS HPF: NORMAL /HPF (ref 0–5)
SODIUM SERPL-SCNC: 138 MMOL/L (ref 136–145)
SP GR UR REFRACTOMETRY: 1.01 (ref 1–1.03)
SPECIMEN EXP DATE BLD: NORMAL
UA: UC IF INDICATED,UAUC: NORMAL
UROBILINOGEN UR QL STRIP.AUTO: 0.2 EU/DL (ref 0.2–1)
WBC # BLD AUTO: 3.6 K/UL (ref 4.1–11.1)
WBC URNS QL MICRO: NORMAL /HPF (ref 0–4)

## 2022-02-04 PROCEDURE — 80048 BASIC METABOLIC PNL TOTAL CA: CPT

## 2022-02-04 PROCEDURE — 83036 HEMOGLOBIN GLYCOSYLATED A1C: CPT

## 2022-02-04 PROCEDURE — 81001 URINALYSIS AUTO W/SCOPE: CPT

## 2022-02-04 PROCEDURE — 85027 COMPLETE CBC AUTOMATED: CPT

## 2022-02-04 PROCEDURE — 85610 PROTHROMBIN TIME: CPT

## 2022-02-04 PROCEDURE — 86900 BLOOD TYPING SEROLOGIC ABO: CPT

## 2022-02-04 PROCEDURE — 93005 ELECTROCARDIOGRAM TRACING: CPT

## 2022-02-04 RX ORDER — IBUPROFEN 200 MG
800 TABLET ORAL EVERY MORNING
COMMUNITY

## 2022-02-04 RX ORDER — MONTELUKAST SODIUM 10 MG/1
10 TABLET ORAL AS NEEDED
COMMUNITY

## 2022-02-04 NOTE — PERIOP NOTES
Copy of COVID Vaccine Card place on chart. Pt stated Dr. Sherri Peterson is aware of Nickel allergy. Left message on voicemail for Estella reporting pt has Nickel allergy. Pt stated EKG in past showed LBBB. Pt stated he saw Dr. Della Loza who stated it was a false readings. Called for cardiology notes and past EKG. Cardiac notes and previous EKG received and placed on chart.   Per cardiac notes pt has h/o LBBB

## 2022-02-04 NOTE — PERIOP NOTES
[address]  ORTHOPAEDIC    Surgery Date:   2/11/22    [facility] staff will call you between 4 PM- 8 PM the day before surgery with your arrival time. If your surgery is on a Monday, we will call you the preceding Friday. Please call [phone] after 8 PM if you did not receive your arrival time. 1. Please report to [facility] Patient Access/Admitting on the 1st floor. Bring your insurance card, photo identification, and any copayment (if applicable). 2. If you are going home the same day of your surgery, you must have a responsible adult to drive you home. You need to have a responsible adult to stay with you the first 24 hours after surgery and you should not drive a car for 24 hours following your surgery. 3. Do NOT eat any solid foods after midnight the night before surgery including candy, mints or gum. You may drink clear liquids from midnight until 1 hour prior to arrival time. You may drink up to 12 ounces at one time every 4 hours. 4. Do NOT drink alcohol or smoke 24 hours before surgery. STOP smoking for 14 days prior as it helps with breathing and healing after surgery. 5. If your arrival time is 3pm or later, you may eat a light breakfast before 8am (toast, bagel-no butter, black coffee, plain tea, fruit juice-no pulp) Please note special instructions, if applicable, below for medications. 6. If you are being admitted to the hospital,please leave personal belongings/luggage in your car until you have an assigned hospital room number. 7. Please wear comfortable clothes. Wear your glasses instead of contacts. We ask that all money, jewelry and valuables be left at home. Wear no make up, particularly mascara, the day of surgery. 8.  All body piercings, rings, and jewelry need to be removed and left at home. Please remove any nail polish or artificial nails from your fingernails. Please wear your hair loose or down.  Please no pony-tails, buns, or any metal hair accessories. If you shower the morning of surgery, please do not apply any lotions or powders afterwards. You may wear deodorant. Do not shave any body area within 24 hours of your surgery. 9. Please follow all instructions to avoid any potential surgical cancellation. 10. Should your physical condition change, (i.e. fever, cold, flu, etc.) please notify your surgeon as soon as possible. 11. It is important to be on time. If a situation occurs where you may be delayed, please call:  [phone] / [phone] on the day of surgery. 12. The Preadmission Testing staff can be reached at [phone]. 13. Special instructions: BRING WALKER FOR FITTING    Current Outpatient Medications   Medication Sig    montelukast (Singulair) 10 mg tablet Take 10 mg by mouth as needed.  ibuprofen (AdviL) 200 mg tablet Take 800 mg by mouth Every morning.  aspirin/caffeine (BC ARTHRITIS PO) Take  by mouth as needed. No current facility-administered medications for this encounter. 1. YOU MUST ONLY TAKE THESE MEDICATIONS THE MORNING OF SURGERY WITH A SIP OF WATER: NONE  2. MEDICATIONS TO TAKE THE MORNING OF SURGERY ONLY IF NEEDED: [address]  3. HOLD these prescription medications BEFORE Surgery: ADVIL 3 DAYS PRIOR TO SURGERY, BC POWDER 7 DAYS PRIOR TO SURGERY  4. Ask your surgeon/prescribing physician about when/if to STOP taking these medications: NONE  5. Stop any non-steroidal anti-inflammatory drugs (i.e. Ibuprofen, Naproxen, Advil, Aleve) 3 days before surgery. You may take Tylenol. STOP all vitamins and herbal supplements 1 week prior to  surgery. 6. If you are currently taking Plavix, Coumadin, or any other blood-thinning/anticoagulant medication contact your prescribing physician for instructions. Preventing Infections Before and After  Your Surgery    IMPORTANT INSTRUCTIONS    Please read and follow these instructions carefully.  If you are unable to comply with the below instructions your procedure will be cancelled. You play an important role in your health and preparation for surgery. To reduce the germs on your skin you will need to shower with CHG soap (Chorhexidine gluconate 4%) two times before surgery. CHG soap (Hibiclens, Hex-A-Clens or store brand)   CHG soap will be provided at your Preadmission Testing (PAT) appointment.  If you do not have a PAT appointment before surgery, you may arrange to  CHG soap from our office or purchase CHG soap at a pharmacy, grocery or department store.  You need to purchase TWO 4 ounce bottles to use for your 2 showers. Steps to follow:  1. Wash your hair with your normal shampoo and your body with regular soap and rinse well to remove shampoo and soap from your skin. 2. Wet a clean washcloth and turn off the shower. 3. Put CHG soap on washcloth and apply to your entire body from the neck down. Do not use on your head, face or private parts(genitals). Do not use CHG soap on open sores, wounds or areas of skin irritation. 4. Wash you body gently for 5 minutes. Do not wash your skin too hard. This soap does not create lather. Pay special attention to your underarms and from your belly button to your feet. 5. Turn the shower back on and rinse well to get CHG soap off your body. 6. Pat your skin dry with a clean, dry towel. Do not apply lotions or moisturizer. 7. Put on clean clothes and sleep on fresh bed sheets and do not allow pets to sleep with you. Shower with CHG soap 2 times before your surgery   The evening before your surgery   The morning of your surgery      Tips to help prevent infections after your surgery:  1. Protect your surgical wound from germs:  ? Hand washing is the most important thing you and your caregivers can do to prevent infections. ? Keep your bandage clean and dry! ? Do not touch your surgical wound.   2. Use clean, freshly washed towels and washcloths every time you shower; do not share bath linens with others. 3. Until your surgical wound is healed, wear clothing and sleep on bed linens each day that are clean and freshly washed. 4. Do not allow pets to sleep in your bed with you or touch your surgical wound. 5. Do not smoke  smoking delays wound healing. This may be a good time to stop smoking. 6. If you have diabetes, it is important for you to manage your blood sugar levels properly before your surgery as well as after your surgery. Poorly managed blood sugar levels slow down wound healing and prevent you from healing completely. Prevention of Infection  Testing for Staphylococcus aureus on your skin before surgery    Staphylococcus aureus (staph) is a common bacteria that is found on the body. It normally does not cause infection on healthy skin. Before surgery, you will be tested to see if you have staph by swabbing the inside of your nose. When you have an incision with surgery, the goal is to protect that incision from infection. Removal of the staph bacteria before surgery can decrease the risk of a surgical site infection. If your nose swab is positive for staph you will be called. Your treatment will include 2 steps:   Prescription for Mupirocin ointment to be used in each nostril twice a day for 5 days.  Showering with Chlorhexidine (CHG) liquid soap for 5 days prior to surgery. How to use Mupirocin ointment in your nose  1.  the prescription from your pharmacy. You will receive a large tube of ointment which will be big enough for all of your treatments. You will apply this ointment to each nostril 2 times a day for 5 days. 2. Wash your hands with  gel or soap and water for 20 seconds before using ointment. 3. Place a pea-sized amount of ointment on a cotton Q-tip. 4. Apply ointment just inside of each nostril with the Q-tip. Do not push Q-tip or ointment deep inside you nose. 5. Press your nostrils together and massage for a few seconds.   6. Wash your hands with  gel or soap and water after you are finished. 7. Do not get ointment near your eyes. If it gets into your eyes, rinse them with cool water. 8. If you need to use nasal spray, clean the tip of the bottle with alcohol before use and do not use both at the same time. 9. If you are scheduled for COVID testing during the 5 days, do NOT apply morning dose until after the COVID test has been performed. How to use Chlorhexidine (CHG) 4% liquid soap  1. Purchase an 8 ounce bottle of CHG liquid soap (Chlorhexidine 4%, Hibiclens, Hex-A-Clens or store brand) at a pharmacy or grocery store. 2. Wash your hair with your normal shampoo and your body with regular soap and rinse well to remove shampoo and soap from your skin. 3. Wet a clean washcloth and turn off the shower. 4. Put CHG soap on washcloth and apply to your entire body from the neck down. Do not use on your head, face or private parts(genitals). Do not use CHG soap on open sores, wounds or areas of skin irritation. 5. Wash your body gently for 5 minutes. Do not wash your skin too hard. This soap does not create lather. Pay special attention to your underarms and from your belly button to your feet. 6. Turn the shower back on and rinse well to get CHG soap off your body. 7. Pat your skin dry with a clean, dry towel. Do not apply lotions or moisturizer. 8. Put on clean clothes and sleep on fresh bed sheets the night before surgery. Do not allow pets to sleep with you. Eating and Drinking Before Surgery     You may eat a regular dinner at the usual time on the day before your surgery.  Do NOT eat any solid foods after midnight unless your arrival time at the hospital is 3pm or later.  You may drink clear liquids only from 12 midnight until 1 hours prior to your arrival time at the hospital on the day of your surgery. Do NOT drink alcohol.    Clear liquids include:  o Water  o Fruit juices without pulp( i.e. apple juice)  o Carbonated beverages  o Black coffee (no cream/milk)  o Tea (no cream/milk)  o Gatorade   You may drink up to 12-16 ounces at one time every 4 hours between the hours of midnight and 1 hour before your arrival time at the hospital. Example- if your arrival time at the hospital is 6am, you may drink 12-16 ounces of clear liquids no later than 5am.   If your arrival time at the hospital is 3pm or later, you may eat a light breakfast before 8am.   A light breakfast includes:  o Toast or bagel (no butter)  o Black coffee (no cream/milk)  o Tea (no cream/milk)  o Fruit juices without pulp ( i.e. apple juice)  o Do NOT eat meat, eggs, vegetables or fruit   If you have any questions, please contact your surgeon's office. [employer]   Instructions for Pre-Surgery COVID-19 Testing 2/8/22 AT 7AM    Across our [facility] we have established standard guidelines to ensure the health and safety of our patients, residents and associates as we resume elective services for patients. All patients presenting for surgery are required to have a COVID-19 test result within 96 hours of their scheduled surgery. [facility] is providing this test free of charge to the patient.    Instructions for COVID-19 Testing:     Patients will receive a call from Pre-Admission Testing 4-5 days prior to surgery to schedule a date and time to come to the [address] [place] [address] Drive for their COVID-19 test   Patients are advised to self-quarantine after testing until their scheduled surgery   Once on site, patients will be registered and receive COVID test in their vehicle   If a patient is scheduled for normal Pre Admission Testing 96 hours from date of surgery, the patient will still have their COVID test done at the [address] [place] [address] located at [address] [place] [address] Positive results will be shared with the surgeon and anesthesiologist and may result in cancellation of the elective procedure    Testing Hours and Location:   Address:  [address] [facility] Pre Admission 11 [facility] in the Discharge Lot on [facility] (Map Attached)  Justyn Blevins [address], [address]   Hours: Monday- Friday 7a-3p    PAT Phone Number: [phone]            Patient Information Regarding COVID Restrictions    Patients are advised to self-quarantine after COVID testing up to the day of the scheduled procedure. Day of Procedure     Please park in the parking deck or any designated visitor parking lot.  Enter the facility through the Main Entrance of the hospital.   A temperature check and appropriate symptom/exposure screening will be done prior to entry to the facility.  On the day of surgery, please provide the cell phone number of the person who will be waiting for you to the Patient Access representative at the time of registration.  Please wear a mask on the day of your procedure.  We are now allowing one designated visitor per stay. Pediatric patients may have 2 designated visitors. This one person may come in with you on the day of your procedure.  No visitors under the age of 13.  The designated visitor must also wear a mask.  Once your procedure and the immediate recovery period is completed, a nurse in the recovery area will contact your designated visitor to inform them of your room number or to otherwise review other pertinent information regarding your care.  Social distancing practices are to be adhered to in waiting areas and the cafeteria. The patient was contacted in person. He verbalized understanding of all instructions does not  need reinforcement.

## 2022-02-05 LAB
ATRIAL RATE: 60 BPM
BACTERIA SPEC CULT: NORMAL
BACTERIA SPEC CULT: NORMAL
CALCULATED P AXIS, ECG09: 5 DEGREES
CALCULATED R AXIS, ECG10: -15 DEGREES
CALCULATED T AXIS, ECG11: 94 DEGREES
DIAGNOSIS, 93000: NORMAL
P-R INTERVAL, ECG05: 202 MS
Q-T INTERVAL, ECG07: 482 MS
QRS DURATION, ECG06: 164 MS
QTC CALCULATION (BEZET), ECG08: 482 MS
SERVICE CMNT-IMP: NORMAL
VENTRICULAR RATE, ECG03: 60 BPM

## 2022-02-07 NOTE — PERIOP NOTES
PAT Nurse Practitioner   Pre-Operative Chart Review/Assessment:-ORTHOPEDIC                Patient Name:  Lakesha Allen                                                           Age:   57 y.o.    :  1960     Today's Date:  2022     Date of PAT:   2022      Date of Surgery:    2022      Procedure(s):  Left Total Knee Arthroplasty     Surgeon:   Dr. Sheri Echeverria                       PLAN:      1)  Medical Clearance:  Dr. Nikki Yarbrough      2)  Cardiac Clearance:  EKG and METs reviewed. Pt seen by Dr. Caroline Melgoza) in the past for LBBB evaluation which was benign. OV 3/2/20 on chart. 3)  Diabetic Treatment Consult:  Not indicated. A1c-5.6      4)  Sleep Apnea evaluation:   Not indicated. ROBERTO Score 2.       5) Treatment for MRSA/Staph Aureus:  Neg      6) Additional Concerns:  EtOH: 21 glasses of wine/week, PONV, LBBB    **NICKEL ALLERGY**              Vital Signs:         Vitals:    22 0905   BP: (!) 158/85   Pulse: 70   Resp: 16   Temp: 98.2 °F (36.8 °C)   Weight: 82.1 kg (181 lb)   Height: 6' 1\" (1.854 m)            ____________________________________________  PAST MEDICAL HISTORY  Past Medical History:   Diagnosis Date    Arthritis     KNEES, LEFT HAND-OSTEO    Chronic pain     Environmental and seasonal allergies     LBBB (left bundle branch block) 10/16/2018    Nausea & vomiting       ____________________________________________  PAST SURGICAL HISTORY  Past Surgical History:   Procedure Laterality Date    HX CERVICAL FUSION      HX GI      COLONOSCOPY    HX KNEE REPLACEMENT Right ,     HX LUMBAR LAMINECTOMY      HX ORTHOPAEDIC Left 2009    HAND SURGERY     HX VASECTOMY        ____________________________________________  HOME MEDICATIONS  Current Outpatient Medications   Medication Sig    montelukast (Singulair) 10 mg tablet Take 10 mg by mouth as needed.  ibuprofen (AdviL) 200 mg tablet Take 800 mg by mouth Every morning.     aspirin/caffeine (BC ARTHRITIS PO) Take  by mouth as needed. No current facility-administered medications for this encounter.      ____________________________________________  ALLERGIES  Allergies   Allergen Reactions    Nickel Other (comments)     INFLAMMATION      ____________________________________________  SOCIAL HISTORY  Social History     Tobacco Use    Smoking status: Never Smoker    Smokeless tobacco: Never Used   Substance Use Topics    Alcohol use: Yes     Alcohol/week: 21.0 standard drinks     Types: 21 Glasses of wine per week     Comment: 2-3 PER DAY WINE      ____________________________________________   Internal Administration   First Dose COVID-19, Pfizer Purple top, DILUTE for use, 12+ yrs, 30mcg/0.3mL dose  03/10/2021   Second Dose COVID-19, Pfizer Purple top, DILUTE for use, 12+ yrs, 30mcg/0.3mL dose  04/03/2021      Last COVID Lab SARS-CoV-2 ( )   Date Value   02/08/2022 Not detected        Labs:     Hospital Outpatient Visit on 02/04/2022   Component Date Value Ref Range Status    Sodium 02/04/2022 138  136 - 145 mmol/L Final    Potassium 02/04/2022 4.1  3.5 - 5.1 mmol/L Final    Chloride 02/04/2022 108  97 - 108 mmol/L Final    CO2 02/04/2022 25  21 - 32 mmol/L Final    Anion gap 02/04/2022 5  5 - 15 mmol/L Final    Glucose 02/04/2022 103* 65 - 100 mg/dL Final    BUN 02/04/2022 12  6 - 20 MG/DL Final    Creatinine 02/04/2022 0.69* 0.70 - 1.30 MG/DL Final    BUN/Creatinine ratio 02/04/2022 17  12 - 20   Final    GFR est AA 02/04/2022 >60  >60 ml/min/1.73m2 Final    GFR est non-AA 02/04/2022 >60  >60 ml/min/1.73m2 Final    Estimated GFR is calculated using the IDMS-traceable Modification of Diet in Renal Disease (MDRD) Study equation, reported for both  Americans (GFRAA) and non- Americans (GFRNA), and normalized to 1.73m2 body surface area. The physician must decide which value applies to the patient.     Calcium 02/04/2022 8.8  8.5 - 10.1 MG/DL Final    WBC 02/04/2022 3.6* 4.1 - 11.1 K/uL Final    RBC 02/04/2022 3.93* 4.10 - 5.70 M/uL Final    HGB 02/04/2022 13.5  12.1 - 17.0 g/dL Final    HCT 02/04/2022 40.0  36.6 - 50.3 % Final    MCV 02/04/2022 101.8* 80.0 - 99.0 FL Final    MCH 02/04/2022 34.4* 26.0 - 34.0 PG Final    MCHC 02/04/2022 33.8  30.0 - 36.5 g/dL Final    RDW 02/04/2022 12.4  11.5 - 14.5 % Final    PLATELET 78/55/8330 430  150 - 400 K/uL Final    MPV 02/04/2022 10.1  8.9 - 12.9 FL Final    NRBC 02/04/2022 0.0  0  WBC Final    ABSOLUTE NRBC 02/04/2022 0.00  0.00 - 0.01 K/uL Final    Crossmatch Expiration 02/04/2022 02/14/2022,2359   Final    ABO/Rh(D) 02/04/2022 O POSITIVE   Final    Antibody screen 02/04/2022 NEG   Final    INR 02/04/2022 1.0  0.9 - 1.1   Final    A single therapeutic range for Vit K antagonists may not be optimal for all indications - see June, 2008 issue of Chest, American College of Chest [employer] Evidence-Based Clinical Practice Guidelines, 8th Edition.     Prothrombin time 02/04/2022 10.0  9.0 - 11.1 sec Final    Color 02/04/2022 YELLOW/STRAW    Final    Color Reference Range: Straw, Yellow or Dark Yellow    Appearance 02/04/2022 CLEAR  CLEAR   Final    Specific gravity 02/04/2022 1.013  1.003 - 1.030   Final    pH (UA) 02/04/2022 6.5  5.0 - 8.0   Final    Protein 02/04/2022 Negative  NEG mg/dL Final    Glucose 02/04/2022 Negative  NEG mg/dL Final    Ketone 02/04/2022 Negative  NEG mg/dL Final    Bilirubin 02/04/2022 Negative  NEG   Final    Blood 02/04/2022 Negative  NEG   Final    Urobilinogen 02/04/2022 0.2  0.2 - 1.0 EU/dL Final    Nitrites 02/04/2022 Negative  NEG   Final    Leukocyte Esterase 02/04/2022 Negative  NEG   Final    UA:UC IF INDICATED 02/04/2022 CULTURE NOT INDICATED BY UA RESULT  CNI   Final    WBC 02/04/2022 0-4  0 - 4 /hpf Final    RBC 02/04/2022 0-5  0 - 5 /hpf Final    Epithelial cells 02/04/2022 FEW  FEW /lpf Final    Epithelial cell category consists of squamous cells and /or transitional urothelial cells. Renal tubular cells, if present, are separately identified as such.  Bacteria 02/04/2022 Negative  NEG /hpf Final    Hyaline cast 02/04/2022 0-2  0 - 5 /lpf Final    Ventricular Rate 02/04/2022 60  BPM Final    Atrial Rate 02/04/2022 60  BPM Final    P-R Interval 02/04/2022 202  ms Final    QRS Duration 02/04/2022 164  ms Final    Q-T Interval 02/04/2022 482  ms Final    QTC Calculation (Bezet) 02/04/2022 482  ms Final    Calculated P Axis 02/04/2022 5  degrees Final    Calculated R Axis 02/04/2022 -15  degrees Final    Calculated T Axis 02/04/2022 94  degrees Final    Diagnosis 02/04/2022    Final                    Value:Sinus rhythm with occasional premature ventricular complexes  Left bundle branch block  Abnormal ECG  When compared with ECG of 15-OCT-2018 07:51,  premature ventricular complexes are now present  T wave inversion more evident in Lateral leads  Confirmed by Triston Marques M.D., Nadine Ortiz ([id number]) on 2/5/2022 7:54:23 AM      Hemoglobin A1c 02/04/2022 5.6  4.0 - 5.6 % Final    Comment: NEW METHOD  PLEASE NOTE NEW REFERENCE RANGE  (NOTE)  HbA1C Interpretive Ranges  <5.7              Normal  5.7 - 6.4         Consider Prediabetes  >6.5              Consider Diabetes      Est. average glucose 02/04/2022 114  mg/dL Final    Special Requests: 02/04/2022 NO SPECIAL REQUESTS    Final    Culture result: 02/04/2022 MRSA NOT PRESENT    Final       Skin:     Denies open wounds, cuts, sores, rashes or other areas of concern in PAT assessment.           Susan Agustin NP

## 2022-02-08 ENCOUNTER — HOSPITAL ENCOUNTER (OUTPATIENT)
Dept: PREADMISSION TESTING | Age: 62
Discharge: HOME | End: 2022-02-08
Attending: ORTHOPAEDIC SURGERY
Payer: COMMERCIAL

## 2022-02-08 DIAGNOSIS — Z01.812 PRE-PROCEDURE LAB EXAM: ICD-10-CM

## 2022-02-08 PROCEDURE — U0005 INFEC AGEN DETEC AMPLI PROBE: HCPCS

## 2022-02-09 LAB
SARS-COV-2, XPLCVT: NOT DETECTED
SOURCE, COVRS: NORMAL

## 2022-02-10 NOTE — H&P
Signed        Expand All Collapse All          []Hide copied text    []Hover for details    Subjective:   Patient ID: Ila Chaney is a 63 y.o. male. Pain Score: Data Unavailable  Chief Complaint: Follow-up of the Left Knee        HPI: Ila Chaney is a 63 y.o. male who returns for follow-up of his left knee. He is s/p a right total knee replacement performed by me around 3 years ago on 11/02/18. Due to a nickel allergy which led to a reaction with the initial prosthesis, he is also s/p a right total knee revision performed by me around 1.5 years ago on 05/15/20. He has a documented history of left knee osteoarthritis. He complains of continued pain in the left knee. He states he no longer experiences swelling in the right knee.  He is ambulating without assistance today.     Medical History        Past Medical History:   Diagnosis Date    Acute deep vein thrombosis (DVT) of proximal vein of left lower extremity 4/10/2018    no relevant past medical history      Primary osteoarthritis, right elbow 11/29/2018         Surgical History         Past Surgical History:   Procedure Laterality Date    HAND SURGERY        JOINT REPLACEMENT        KNEE SURGERY        NO RELEVANT SURGERIES        SPINE SURGERY                   Family History   Problem Relation Age of Onset    Diabetes Mother      No Known Problems Father      No Known Problems Brother      No Known Problems Sister      Clotting disorder Brother      No Known Problems Son      No Known Problems Daughter      No Known Problems Other      Coronary artery disease Neg Hx      Anesthesia problems Neg Hx        [unfilled]  Review of Systems   12/14/2021     Constitutional: Unexplained: Negative  Genitourinary: Frequent Urination: Negative  HEENT: Vision Loss: Negative  Neurological: Memory Loss: Negative  Integumentary: Rash: Negative  Cardiovascular: Palpatations: Negative  Hematologic: Bruises/Bleeds Easily: Negative  Gastrointestinal: Constipation: Negative  Immunological: Seasonal Allergies: Positive  Musculoskeletal: Joint Pain: Positive  Objective:      Vitals       Vitals:     12/14/21 0850   Weight: 185 lb   Height: 6' 1\"         Constitutional:  No acute distress. Well nourished. Well developed. Psychiatric: Alert and oriented x3. Skin:  No marked skin ulcers. Neurological:  No apparent deficits       Patient Active Problem List    Diagnosis Date Noted    Painful total knee replacement, right ([facility] [address] 05/15/2020    Nickel allergy 05/15/2020    Osteoarthritis of right knee 11/02/2018    Post-traumatic osteoarthritis of right knee 11/01/2018    LBBB (left bundle branch block) 10/16/2018     Past Medical History:   Diagnosis Date    Arthritis     KNEES, LEFT HAND-OSTEO    Chronic pain     Environmental and seasonal allergies     LBBB (left bundle branch block) 10/16/2018    Nausea & vomiting       Past Surgical History:   Procedure Laterality Date    HX CERVICAL FUSION  2006    HX GI      COLONOSCOPY    HX KNEE REPLACEMENT Right 2017, 2020    HX LUMBAR LAMINECTOMY  1993    HX ORTHOPAEDIC Left 2009    HAND SURGERY     HX VASECTOMY  1983      Prior to Admission medications    Medication Sig Start Date End Date Taking? Authorizing Provider   montelukast (Singulair) 10 mg tablet Take 10 mg by mouth as needed. Provider, Historical   ibuprofen (AdviL) 200 mg tablet Take 800 mg by mouth Every morning. Provider, Historical   aspirin/caffeine (BC ARTHRITIS PO) Take  by mouth as needed. Provider, Historical     Allergies   Allergen Reactions    Nickel Other (comments)     INFLAMMATION      Social History     Tobacco Use    Smoking status: Never Smoker    Smokeless tobacco: Never Used   Substance Use Topics    Alcohol use:  Yes     Alcohol/week: 21.0 standard drinks     Types: 21 Glasses of wine per week     Comment: 2-3 PER DAY WINE      Family History   Problem Relation Age of Onset    OSTEOARTHRITIS Mother     Diabetes Mother     Hypertension Mother     Psychiatric Disorder Mother     Cancer Father         JAW     OSTEOARTHRITIS Brother     OSTEOARTHRITIS Brother     No Known Problems Son     No Known Problems Son     Anesth Problems Neg Hx             No data found. General appearance: alert, cooperative, no distress, appears stated age  Head: Normocephalic, without obvious abnormality, atraumatic  Lungs: clear to auscultation bilaterally  Heart: regular rate and rhythm, S1, S2 normal, no murmur  Abdomen: soft, non-tender. Bowel sounds normal. No masses,  no organomegaly  Extremities: Right Knee:  well-healed incision.  No skin changes, rashes, erythema, deformity, or bruising.  2+ effusion.  He says his effusion recurred fairly quickly after aspiration last time  He has full extension flexion to about 100. He can straight leg raise without a lag quadriceps is intact.  He has negative Lachman negative drawer.  He has no tenderness around the knee.  He has good pulses good cap refill and no edema distally. Left Knee:  No skin changes, rashes, erythema, deformity, or bruising.  Severe varus deformity lacks about 5 degrees of full extension.  He has a trace effusion.  Pseudo opening with valgus stress.  Tenderness over the medial joint line.  Normal stability.  Good pulses good sensation good cap refill and no edema distally  Pulses: 2+ and symmetric  Neurologic: Grossly normal    Radiographs:           No imaging obtained       Assessment:      1. Primary osteoarthritis of left knee    2. S/P TKR (total knee replacement) using cement, right    3. Pain due to total right knee replacement, subsequent encounter           Patient Active Problem List   Diagnosis    Post-traumatic osteoarthritis of right knee    Pain due to total right knee replacement      Plan:      He is ready to have the left knee done.   Brace has been very helpful but he still has failed conservative management with injection anti-inflammatories rest and activity modification and bracing. It continues to bother him in wake him from sleep at night and prevented from being able do his activities of daily living. X-rays of the left knee show severe medial compartment arthritis with medial shift of the femur on the tibia and medial spurring of the tibia and femur. He wants to move forward with knee replacement on the left side. Because his previous response to metal and persistent swelling after the 2nd operation we will plan on using Oxinium component as his primary replacement on the left knee. I discussed the continued diagnosis of left knee osteoarthritis with the patient. I explained to the patient that if we were to proceed with left total knee replacement, we would use a prosthesis without metal to avoid another reaction. I informed the patient that the cement may have caused the reaction, however this is much less likely to have caused the reaction than the metal, and we would be unable to use a nonmetal prothesis without cement.     Total knee replacement was discussed with the patient today including the risks benefits and possible complications. It was discussed with them that the surgery is done on same day surgery basis. The patient will arrive the day of surgery. Surgery will be done under spinal and block with sedation. The main risks of the operation are blood loss infection and persistent pain. Surgery would last approximately an hour and a half the patient was then go to the floor with a would be expected to walk on the 1st day. Prior to discharge that would be required to walk a certain distance be able to get up on their own and ambulate stairs. Patient would be required to be on a blood thinner after surgery. This will be required for at least 4 weeks postop. Will complete recovery is expected take 3-6 months. All this was explained to the patient and they voiced complete understanding.   It would be expected that the patient would require postoperative pain medicine for up to 8 weeks after surgery. Patient desired to go forward with surgery and is already scheduled for surgery in February.     Date of Surgery Update:  Evi Brochure was seen and examined. History and physical has been reviewed. The patient has been examined. There have been no significant clinical changes since the completion of the originally dated History and Physical.  Patient identified by surgeon; surgical site was confirmed by patient and surgeon.     Signed By: Rubné) Danis Stiles MD     February 11, 2022 7:30 AM

## 2022-02-11 ENCOUNTER — ANESTHESIA EVENT (OUTPATIENT)
Dept: SURGERY | Age: 62
End: 2022-02-11
Payer: COMMERCIAL

## 2022-02-11 ENCOUNTER — HOSPITAL ENCOUNTER (OUTPATIENT)
Age: 62
Setting detail: OUTPATIENT SURGERY
Discharge: HOME HEALTH | End: 2022-02-11
Attending: ORTHOPAEDIC SURGERY | Admitting: ORTHOPAEDIC SURGERY
Payer: COMMERCIAL

## 2022-02-11 ENCOUNTER — ANESTHESIA (OUTPATIENT)
Dept: SURGERY | Age: 62
End: 2022-02-11
Payer: COMMERCIAL

## 2022-02-11 ENCOUNTER — APPOINTMENT (OUTPATIENT)
Dept: GENERAL RADIOLOGY | Age: 62
End: 2022-02-11
Attending: ORTHOPAEDIC SURGERY
Payer: COMMERCIAL

## 2022-02-11 VITALS
TEMPERATURE: 96.2 F | DIASTOLIC BLOOD PRESSURE: 92 MMHG | HEART RATE: 58 BPM | SYSTOLIC BLOOD PRESSURE: 157 MMHG | OXYGEN SATURATION: 94 % | WEIGHT: 181 LBS | RESPIRATION RATE: 18 BRPM | HEIGHT: 73 IN | BODY MASS INDEX: 23.99 KG/M2

## 2022-02-11 DIAGNOSIS — M17.12 PRIMARY OSTEOARTHRITIS OF LEFT KNEE: Primary | ICD-10-CM

## 2022-02-11 LAB
GLUCOSE BLD STRIP.AUTO-MCNC: 105 MG/DL (ref 65–117)
SERVICE CMNT-IMP: NORMAL

## 2022-02-11 PROCEDURE — 74011000258 HC RX REV CODE- 258: Performed by: NURSE ANESTHETIST, CERTIFIED REGISTERED

## 2022-02-11 PROCEDURE — C1776 JOINT DEVICE (IMPLANTABLE): HCPCS | Performed by: ORTHOPAEDIC SURGERY

## 2022-02-11 PROCEDURE — 77030007866 HC KT SPN ANES BBMI -B: Performed by: ANESTHESIOLOGY

## 2022-02-11 PROCEDURE — 2709999900 HC NON-CHARGEABLE SUPPLY

## 2022-02-11 PROCEDURE — 74011000250 HC RX REV CODE- 250: Performed by: ORTHOPAEDIC SURGERY

## 2022-02-11 PROCEDURE — 74011250636 HC RX REV CODE- 250/636: Performed by: ANESTHESIOLOGY

## 2022-02-11 PROCEDURE — 77030012551: Performed by: ORTHOPAEDIC SURGERY

## 2022-02-11 PROCEDURE — 74011250636 HC RX REV CODE- 250/636: Performed by: ORTHOPAEDIC SURGERY

## 2022-02-11 PROCEDURE — 76060000035 HC ANESTHESIA 2 TO 2.5 HR: Performed by: ORTHOPAEDIC SURGERY

## 2022-02-11 PROCEDURE — 77030005513 HC CATH URETH FOL11 MDII -B: Performed by: ORTHOPAEDIC SURGERY

## 2022-02-11 PROCEDURE — 97116 GAIT TRAINING THERAPY: CPT

## 2022-02-11 PROCEDURE — 76010000171 HC OR TIME 2 TO 2.5 HR INTENSV-TIER 1: Performed by: ORTHOPAEDIC SURGERY

## 2022-02-11 PROCEDURE — 74011250637 HC RX REV CODE- 250/637: Performed by: PHYSICIAN ASSISTANT

## 2022-02-11 PROCEDURE — C9290 INJ, BUPIVACAINE LIPOSOME: HCPCS | Performed by: ORTHOPAEDIC SURGERY

## 2022-02-11 PROCEDURE — 2709999900 HC NON-CHARGEABLE SUPPLY: Performed by: ORTHOPAEDIC SURGERY

## 2022-02-11 PROCEDURE — 77030014077 HC TOWER MX CEM J&J -C: Performed by: ORTHOPAEDIC SURGERY

## 2022-02-11 PROCEDURE — 77030035236 HC SUT PDS STRATFX BARB J&J -B: Performed by: ORTHOPAEDIC SURGERY

## 2022-02-11 PROCEDURE — 97161 PT EVAL LOW COMPLEX 20 MIN: CPT

## 2022-02-11 PROCEDURE — 76210000022 HC REC RM PH II 1.5 TO 2 HR: Performed by: ORTHOPAEDIC SURGERY

## 2022-02-11 PROCEDURE — 77030039497 HC CST PAD STERILE CHCS -A: Performed by: ORTHOPAEDIC SURGERY

## 2022-02-11 PROCEDURE — 76210000000 HC OR PH I REC 2 TO 2.5 HR: Performed by: ORTHOPAEDIC SURGERY

## 2022-02-11 PROCEDURE — C1713 ANCHOR/SCREW BN/BN,TIS/BN: HCPCS | Performed by: ORTHOPAEDIC SURGERY

## 2022-02-11 PROCEDURE — 74011000258 HC RX REV CODE- 258: Performed by: ORTHOPAEDIC SURGERY

## 2022-02-11 PROCEDURE — 77030006822 HC BLD SAW SAG BRSM -B: Performed by: ORTHOPAEDIC SURGERY

## 2022-02-11 PROCEDURE — 74011000250 HC RX REV CODE- 250: Performed by: NURSE ANESTHETIST, CERTIFIED REGISTERED

## 2022-02-11 PROCEDURE — 82962 GLUCOSE BLOOD TEST: CPT

## 2022-02-11 PROCEDURE — 77030006835 HC BLD SAW SAG STRY -B: Performed by: ORTHOPAEDIC SURGERY

## 2022-02-11 PROCEDURE — 74011250636 HC RX REV CODE- 250/636: Performed by: NURSE ANESTHETIST, CERTIFIED REGISTERED

## 2022-02-11 PROCEDURE — 73560 X-RAY EXAM OF KNEE 1 OR 2: CPT

## 2022-02-11 PROCEDURE — 74011250637 HC RX REV CODE- 250/637: Performed by: ORTHOPAEDIC SURGERY

## 2022-02-11 PROCEDURE — 77030040361 HC SLV COMPR DVT MDII -B: Performed by: ORTHOPAEDIC SURGERY

## 2022-02-11 PROCEDURE — 77030031139 HC SUT VCRL2 J&J -A: Performed by: ORTHOPAEDIC SURGERY

## 2022-02-11 PROCEDURE — 77030002933 HC SUT MCRYL J&J -A: Performed by: ORTHOPAEDIC SURGERY

## 2022-02-11 DEVICE — SMARTSET HIGH PERFORMANCE MV MEDIUM VISCOSITY BONE CEMENT 40G
Type: IMPLANTABLE DEVICE | Site: KNEE | Status: FUNCTIONAL
Brand: SMARTSET

## 2022-02-11 DEVICE — LEGION CRUCIATE RETAINING OXINIUM                                    FEMORAL SIZE 7 LEFT
Type: IMPLANTABLE DEVICE | Site: KNEE | Status: FUNCTIONAL
Brand: LEGION

## 2022-02-11 DEVICE — LEGION HIGHLY CROSS LINKED                                    POLYETHYLENE DISHED INSERT SIZE 5-6 13MM
Type: IMPLANTABLE DEVICE | Site: KNEE | Status: FUNCTIONAL
Brand: LEGION

## 2022-02-11 DEVICE — GENESIS II RESURFACING PATELLAR 35MM
Type: IMPLANTABLE DEVICE | Site: KNEE | Status: FUNCTIONAL
Brand: GENESIS II

## 2022-02-11 DEVICE — GENESIS II NON-POROUS TIBIAL                                    BASEPLATE SIZE 6 LT
Type: IMPLANTABLE DEVICE | Site: KNEE | Status: FUNCTIONAL
Brand: GENESIS II

## 2022-02-11 DEVICE — KNEE K1 TOT HEMI STD CEM IMPL CAPPED K1 SN: Type: IMPLANTABLE DEVICE | Status: FUNCTIONAL

## 2022-02-11 RX ORDER — LIDOCAINE HYDROCHLORIDE 10 MG/ML
0.1 INJECTION, SOLUTION EPIDURAL; INFILTRATION; INTRACAUDAL; PERINEURAL AS NEEDED
Status: DISCONTINUED | OUTPATIENT
Start: 2022-02-11 | End: 2022-02-11 | Stop reason: HOSPADM

## 2022-02-11 RX ORDER — CELECOXIB 200 MG/1
200 CAPSULE ORAL ONCE
Status: DISCONTINUED | OUTPATIENT
Start: 2022-02-11 | End: 2022-02-11 | Stop reason: SDUPTHER

## 2022-02-11 RX ORDER — SODIUM CHLORIDE 9 MG/ML
125 INJECTION, SOLUTION INTRAVENOUS CONTINUOUS
Status: DISCONTINUED | OUTPATIENT
Start: 2022-02-11 | End: 2022-02-11 | Stop reason: HOSPADM

## 2022-02-11 RX ORDER — SODIUM CHLORIDE 0.9 % (FLUSH) 0.9 %
5-40 SYRINGE (ML) INJECTION EVERY 8 HOURS
Status: DISCONTINUED | OUTPATIENT
Start: 2022-02-11 | End: 2022-02-11 | Stop reason: HOSPADM

## 2022-02-11 RX ORDER — SODIUM CHLORIDE 9 MG/ML
50 INJECTION, SOLUTION INTRAVENOUS CONTINUOUS
Status: DISCONTINUED | OUTPATIENT
Start: 2022-02-11 | End: 2022-02-11 | Stop reason: HOSPADM

## 2022-02-11 RX ORDER — EPHEDRINE SULFATE/0.9% NACL/PF 50 MG/5 ML
5 SYRINGE (ML) INTRAVENOUS AS NEEDED
Status: DISCONTINUED | OUTPATIENT
Start: 2022-02-11 | End: 2022-02-11 | Stop reason: HOSPADM

## 2022-02-11 RX ORDER — PREGABALIN 75 MG/1
75 CAPSULE ORAL ONCE
Status: COMPLETED | OUTPATIENT
Start: 2022-02-11 | End: 2022-02-11

## 2022-02-11 RX ORDER — CELECOXIB 200 MG/1
200 CAPSULE ORAL ONCE
Status: COMPLETED | OUTPATIENT
Start: 2022-02-11 | End: 2022-02-11

## 2022-02-11 RX ORDER — SODIUM CHLORIDE 0.9 % (FLUSH) 0.9 %
5-40 SYRINGE (ML) INJECTION AS NEEDED
Status: DISCONTINUED | OUTPATIENT
Start: 2022-02-11 | End: 2022-02-11 | Stop reason: HOSPADM

## 2022-02-11 RX ORDER — CELECOXIB 200 MG/1
200 CAPSULE ORAL EVERY 12 HOURS
Status: DISCONTINUED | OUTPATIENT
Start: 2022-02-11 | End: 2022-02-11 | Stop reason: HOSPADM

## 2022-02-11 RX ORDER — SODIUM CHLORIDE 9 MG/ML
1000 INJECTION, SOLUTION INTRAVENOUS CONTINUOUS
Status: DISCONTINUED | OUTPATIENT
Start: 2022-02-11 | End: 2022-02-11 | Stop reason: HOSPADM

## 2022-02-11 RX ORDER — CELECOXIB 200 MG/1
200 CAPSULE ORAL 2 TIMES DAILY
Status: DISCONTINUED | OUTPATIENT
Start: 2022-02-11 | End: 2022-02-11

## 2022-02-11 RX ORDER — DIPHENHYDRAMINE HYDROCHLORIDE 50 MG/ML
12.5 INJECTION, SOLUTION INTRAMUSCULAR; INTRAVENOUS AS NEEDED
Status: DISCONTINUED | OUTPATIENT
Start: 2022-02-11 | End: 2022-02-11 | Stop reason: HOSPADM

## 2022-02-11 RX ORDER — ACETAMINOPHEN 500 MG
1000 TABLET ORAL ONCE
Status: DISCONTINUED | OUTPATIENT
Start: 2022-02-11 | End: 2022-02-11 | Stop reason: SDUPTHER

## 2022-02-11 RX ORDER — ROPIVACAINE HYDROCHLORIDE 5 MG/ML
INJECTION, SOLUTION EPIDURAL; INFILTRATION; PERINEURAL
Status: COMPLETED | OUTPATIENT
Start: 2022-02-11 | End: 2022-02-11

## 2022-02-11 RX ORDER — ACETAMINOPHEN 325 MG/1
650 TABLET ORAL ONCE
Status: DISCONTINUED | OUTPATIENT
Start: 2022-02-11 | End: 2022-02-11 | Stop reason: SDUPTHER

## 2022-02-11 RX ORDER — NALOXONE HYDROCHLORIDE 0.4 MG/ML
0.4 INJECTION, SOLUTION INTRAMUSCULAR; INTRAVENOUS; SUBCUTANEOUS AS NEEDED
Status: CANCELLED | OUTPATIENT
Start: 2022-02-11

## 2022-02-11 RX ORDER — OXYCODONE HYDROCHLORIDE 5 MG/1
10 TABLET ORAL
Status: DISCONTINUED | OUTPATIENT
Start: 2022-02-11 | End: 2022-02-11 | Stop reason: HOSPADM

## 2022-02-11 RX ORDER — OXYCODONE AND ACETAMINOPHEN 5; 325 MG/1; MG/1
1 TABLET ORAL AS NEEDED
Status: DISCONTINUED | OUTPATIENT
Start: 2022-02-11 | End: 2022-02-11 | Stop reason: HOSPADM

## 2022-02-11 RX ORDER — SODIUM CHLORIDE, SODIUM LACTATE, POTASSIUM CHLORIDE, CALCIUM CHLORIDE 600; 310; 30; 20 MG/100ML; MG/100ML; MG/100ML; MG/100ML
125 INJECTION, SOLUTION INTRAVENOUS CONTINUOUS
Status: DISCONTINUED | OUTPATIENT
Start: 2022-02-11 | End: 2022-02-11 | Stop reason: HOSPADM

## 2022-02-11 RX ORDER — KETAMINE HYDROCHLORIDE 10 MG/ML
INJECTION, SOLUTION INTRAMUSCULAR; INTRAVENOUS AS NEEDED
Status: DISCONTINUED | OUTPATIENT
Start: 2022-02-11 | End: 2022-02-11 | Stop reason: HOSPADM

## 2022-02-11 RX ORDER — MORPHINE SULFATE 2 MG/ML
2 INJECTION, SOLUTION INTRAMUSCULAR; INTRAVENOUS
Status: DISCONTINUED | OUTPATIENT
Start: 2022-02-11 | End: 2022-02-11 | Stop reason: HOSPADM

## 2022-02-11 RX ORDER — PREGABALIN 75 MG/1
75 CAPSULE ORAL ONCE
Status: DISCONTINUED | OUTPATIENT
Start: 2022-02-11 | End: 2022-02-11 | Stop reason: SDUPTHER

## 2022-02-11 RX ORDER — HYDROMORPHONE HYDROCHLORIDE 1 MG/ML
0.2 INJECTION, SOLUTION INTRAMUSCULAR; INTRAVENOUS; SUBCUTANEOUS
Status: DISCONTINUED | OUTPATIENT
Start: 2022-02-11 | End: 2022-02-11 | Stop reason: HOSPADM

## 2022-02-11 RX ORDER — MIDAZOLAM HYDROCHLORIDE 1 MG/ML
0.5 INJECTION, SOLUTION INTRAMUSCULAR; INTRAVENOUS
Status: DISCONTINUED | OUTPATIENT
Start: 2022-02-11 | End: 2022-02-11 | Stop reason: HOSPADM

## 2022-02-11 RX ORDER — ONDANSETRON 2 MG/ML
INJECTION INTRAMUSCULAR; INTRAVENOUS AS NEEDED
Status: DISCONTINUED | OUTPATIENT
Start: 2022-02-11 | End: 2022-02-11 | Stop reason: HOSPADM

## 2022-02-11 RX ORDER — ACETAMINOPHEN 500 MG
1000 TABLET ORAL ONCE
Status: COMPLETED | OUTPATIENT
Start: 2022-02-11 | End: 2022-02-11

## 2022-02-11 RX ORDER — HYDROMORPHONE HYDROCHLORIDE 1 MG/ML
0.5 INJECTION, SOLUTION INTRAMUSCULAR; INTRAVENOUS; SUBCUTANEOUS
Status: CANCELLED | OUTPATIENT
Start: 2022-02-11 | End: 2022-02-12

## 2022-02-11 RX ORDER — CEFAZOLIN SODIUM 1 G/3ML
INJECTION, POWDER, FOR SOLUTION INTRAMUSCULAR; INTRAVENOUS AS NEEDED
Status: DISCONTINUED | OUTPATIENT
Start: 2022-02-11 | End: 2022-02-11 | Stop reason: HOSPADM

## 2022-02-11 RX ORDER — DIPHENHYDRAMINE HYDROCHLORIDE 50 MG/ML
12.5 INJECTION, SOLUTION INTRAMUSCULAR; INTRAVENOUS
Status: CANCELLED | OUTPATIENT
Start: 2022-02-11 | End: 2022-02-12

## 2022-02-11 RX ORDER — SODIUM CHLORIDE, SODIUM LACTATE, POTASSIUM CHLORIDE, CALCIUM CHLORIDE 600; 310; 30; 20 MG/100ML; MG/100ML; MG/100ML; MG/100ML
INJECTION, SOLUTION INTRAVENOUS
Status: DISCONTINUED | OUTPATIENT
Start: 2022-02-11 | End: 2022-02-11 | Stop reason: HOSPADM

## 2022-02-11 RX ORDER — FACIAL-BODY WIPES
10 EACH TOPICAL DAILY PRN
Status: CANCELLED | OUTPATIENT
Start: 2022-02-13

## 2022-02-11 RX ORDER — FENTANYL CITRATE 50 UG/ML
50 INJECTION, SOLUTION INTRAMUSCULAR; INTRAVENOUS AS NEEDED
Status: DISCONTINUED | OUTPATIENT
Start: 2022-02-11 | End: 2022-02-11 | Stop reason: HOSPADM

## 2022-02-11 RX ORDER — FAMOTIDINE 20 MG/1
20 TABLET, FILM COATED ORAL 2 TIMES DAILY
Status: CANCELLED | OUTPATIENT
Start: 2022-02-11

## 2022-02-11 RX ORDER — OXYCODONE HYDROCHLORIDE 5 MG/1
5 TABLET ORAL
Status: DISCONTINUED | OUTPATIENT
Start: 2022-02-11 | End: 2022-02-11 | Stop reason: HOSPADM

## 2022-02-11 RX ORDER — OXYCODONE HYDROCHLORIDE 5 MG/1
5 TABLET ORAL
Qty: 40 TABLET | Refills: 0 | Status: SHIPPED | OUTPATIENT
Start: 2022-02-11 | End: 2022-02-18

## 2022-02-11 RX ORDER — ONDANSETRON 2 MG/ML
4 INJECTION INTRAMUSCULAR; INTRAVENOUS AS NEEDED
Status: DISCONTINUED | OUTPATIENT
Start: 2022-02-11 | End: 2022-02-11 | Stop reason: HOSPADM

## 2022-02-11 RX ORDER — MIDAZOLAM HYDROCHLORIDE 1 MG/ML
1 INJECTION, SOLUTION INTRAMUSCULAR; INTRAVENOUS AS NEEDED
Status: DISCONTINUED | OUTPATIENT
Start: 2022-02-11 | End: 2022-02-11 | Stop reason: HOSPADM

## 2022-02-11 RX ORDER — GUAIFENESIN 100 MG/5ML
81 LIQUID (ML) ORAL DAILY
Qty: 60 TABLET | Refills: 0 | Status: SHIPPED
Start: 2022-02-11

## 2022-02-11 RX ORDER — SODIUM CHLORIDE 0.9 % (FLUSH) 0.9 %
5-40 SYRINGE (ML) INJECTION EVERY 8 HOURS
Status: CANCELLED | OUTPATIENT
Start: 2022-02-11

## 2022-02-11 RX ORDER — ACETAMINOPHEN 500 MG
1000 TABLET ORAL EVERY 6 HOURS
Status: DISCONTINUED | OUTPATIENT
Start: 2022-02-11 | End: 2022-02-11 | Stop reason: HOSPADM

## 2022-02-11 RX ORDER — ONDANSETRON 2 MG/ML
4 INJECTION INTRAMUSCULAR; INTRAVENOUS
Status: CANCELLED | OUTPATIENT
Start: 2022-02-11 | End: 2022-02-12

## 2022-02-11 RX ORDER — FENTANYL CITRATE 50 UG/ML
25 INJECTION, SOLUTION INTRAMUSCULAR; INTRAVENOUS
Status: DISCONTINUED | OUTPATIENT
Start: 2022-02-11 | End: 2022-02-11 | Stop reason: HOSPADM

## 2022-02-11 RX ORDER — PHENYLEPHRINE HCL IN 0.9% NACL 0.4MG/10ML
SYRINGE (ML) INTRAVENOUS
Status: DISCONTINUED | OUTPATIENT
Start: 2022-02-11 | End: 2022-02-11 | Stop reason: HOSPADM

## 2022-02-11 RX ORDER — ASPIRIN 81 MG/1
81 TABLET ORAL 2 TIMES DAILY
Status: CANCELLED | OUTPATIENT
Start: 2022-02-11

## 2022-02-11 RX ORDER — POLYETHYLENE GLYCOL 3350 17 G/17G
17 POWDER, FOR SOLUTION ORAL DAILY
Status: CANCELLED | OUTPATIENT
Start: 2022-02-11

## 2022-02-11 RX ORDER — OXYCODONE HYDROCHLORIDE 5 MG/1
5 TABLET ORAL
Qty: 41 TABLET | Refills: 0 | Status: SHIPPED | OUTPATIENT
Start: 2022-02-11 | End: 2022-02-11 | Stop reason: SDUPTHER

## 2022-02-11 RX ORDER — MIDAZOLAM HYDROCHLORIDE 1 MG/ML
INJECTION, SOLUTION INTRAMUSCULAR; INTRAVENOUS AS NEEDED
Status: DISCONTINUED | OUTPATIENT
Start: 2022-02-11 | End: 2022-02-11 | Stop reason: HOSPADM

## 2022-02-11 RX ORDER — MONTELUKAST SODIUM 10 MG/1
10 TABLET ORAL AS NEEDED
Status: CANCELLED | OUTPATIENT
Start: 2022-02-11

## 2022-02-11 RX ORDER — AMOXICILLIN 250 MG
1 CAPSULE ORAL 2 TIMES DAILY
Status: CANCELLED | OUTPATIENT
Start: 2022-02-11

## 2022-02-11 RX ORDER — SODIUM CHLORIDE 0.9 % (FLUSH) 0.9 %
5-40 SYRINGE (ML) INJECTION AS NEEDED
Status: CANCELLED | OUTPATIENT
Start: 2022-02-11

## 2022-02-11 RX ORDER — PROPOFOL 10 MG/ML
INJECTION, EMULSION INTRAVENOUS AS NEEDED
Status: DISCONTINUED | OUTPATIENT
Start: 2022-02-11 | End: 2022-02-11 | Stop reason: HOSPADM

## 2022-02-11 RX ADMIN — OXYCODONE HYDROCHLORIDE 10 MG: 5 TABLET ORAL at 13:48

## 2022-02-11 RX ADMIN — MIDAZOLAM HYDROCHLORIDE 1 MG: 1 INJECTION, SOLUTION INTRAMUSCULAR; INTRAVENOUS at 10:30

## 2022-02-11 RX ADMIN — PROPOFOL 50 MG: 10 INJECTION, EMULSION INTRAVENOUS at 09:09

## 2022-02-11 RX ADMIN — SODIUM CHLORIDE, POTASSIUM CHLORIDE, SODIUM LACTATE AND CALCIUM CHLORIDE 125 ML/HR: 600; 310; 30; 20 INJECTION, SOLUTION INTRAVENOUS at 08:15

## 2022-02-11 RX ADMIN — SODIUM CHLORIDE, POTASSIUM CHLORIDE, SODIUM LACTATE AND CALCIUM CHLORIDE: 600; 310; 30; 20 INJECTION, SOLUTION INTRAVENOUS at 10:56

## 2022-02-11 RX ADMIN — CEFAZOLIN 2 G: 1 INJECTION, POWDER, FOR SOLUTION INTRAMUSCULAR; INTRAVENOUS at 15:50

## 2022-02-11 RX ADMIN — MIDAZOLAM HYDROCHLORIDE 1 MG: 1 INJECTION, SOLUTION INTRAMUSCULAR; INTRAVENOUS at 11:06

## 2022-02-11 RX ADMIN — ACETAMINOPHEN 1000 MG: 500 TABLET ORAL at 08:11

## 2022-02-11 RX ADMIN — PROPOFOL 80 MCG/KG/MIN: 10 INJECTION, EMULSION INTRAVENOUS at 09:10

## 2022-02-11 RX ADMIN — CELECOXIB 200 MG: 200 CAPSULE ORAL at 08:11

## 2022-02-11 RX ADMIN — MIDAZOLAM HYDROCHLORIDE 1.5 MG: 1 INJECTION, SOLUTION INTRAMUSCULAR; INTRAVENOUS at 09:59

## 2022-02-11 RX ADMIN — SODIUM CHLORIDE 125 ML/HR: 9 INJECTION, SOLUTION INTRAVENOUS at 12:08

## 2022-02-11 RX ADMIN — MIDAZOLAM HYDROCHLORIDE 1.5 MG: 1 INJECTION, SOLUTION INTRAMUSCULAR; INTRAVENOUS at 09:09

## 2022-02-11 RX ADMIN — PROPOFOL 50 MG: 10 INJECTION, EMULSION INTRAVENOUS at 11:06

## 2022-02-11 RX ADMIN — ROPIVACAINE HYDROCHLORIDE 25 ML: 5 INJECTION, SOLUTION EPIDURAL; INFILTRATION; PERINEURAL at 08:25

## 2022-02-11 RX ADMIN — FENTANYL CITRATE 50 MCG: 50 INJECTION, SOLUTION INTRAMUSCULAR; INTRAVENOUS at 08:22

## 2022-02-11 RX ADMIN — Medication 10 MG: at 10:30

## 2022-02-11 RX ADMIN — SODIUM CHLORIDE, POTASSIUM CHLORIDE, SODIUM LACTATE AND CALCIUM CHLORIDE: 600; 310; 30; 20 INJECTION, SOLUTION INTRAVENOUS at 09:02

## 2022-02-11 RX ADMIN — Medication 10 MG: at 09:17

## 2022-02-11 RX ADMIN — PREGABALIN 75 MG: 75 CAPSULE ORAL at 08:11

## 2022-02-11 RX ADMIN — PROPOFOL 50 MG: 10 INJECTION, EMULSION INTRAVENOUS at 09:29

## 2022-02-11 RX ADMIN — ONDANSETRON HYDROCHLORIDE 4 MG: 2 INJECTION, SOLUTION INTRAMUSCULAR; INTRAVENOUS at 11:13

## 2022-02-11 RX ADMIN — ACETAMINOPHEN 1000 MG: 500 TABLET ORAL at 13:48

## 2022-02-11 RX ADMIN — MIDAZOLAM 2 MG: 1 INJECTION INTRAMUSCULAR; INTRAVENOUS at 08:24

## 2022-02-11 RX ADMIN — Medication 40 MCG/MIN: at 09:09

## 2022-02-11 RX ADMIN — TRANEXAMIC ACID 1 G: 100 INJECTION, SOLUTION INTRAVENOUS at 09:23

## 2022-02-11 RX ADMIN — PROPOFOL 50 MG: 10 INJECTION, EMULSION INTRAVENOUS at 09:19

## 2022-02-11 RX ADMIN — CEFAZOLIN 2 G: 330 INJECTION, POWDER, FOR SOLUTION INTRAMUSCULAR; INTRAVENOUS at 09:35

## 2022-02-11 RX ADMIN — Medication 10 MG: at 09:59

## 2022-02-11 NOTE — ANESTHESIA PROCEDURE NOTES
Peripheral Block    Start time: 2/11/2022 8:18 AM  End time: 2/11/2022 8:28 AM  Performed by: John Peralta MD  Authorized by: John Peralta MD       Pre-procedure: Indications: at surgeon's request and post-op pain management    Preanesthetic Checklist: patient identified, risks and benefits discussed, site marked, timeout performed, anesthesia consent given and patient being monitored    Timeout Time: 08:18 EST          Block Type:   Block Type:   Adductor canal block  Laterality:  Left  Monitoring:  Standard ASA monitoring, continuous pulse ox, frequent vital sign checks, heart rate, responsive to questions and oxygen  Injection Technique:  Single shot  Procedures: ultrasound guided    Patient Position: supine  Prep: chlorhexidine    Needle Type:  Stimuplex  Needle Gauge:  22 G  Needle Localization:  Ultrasound guidance  Medication Injected:  Ropivacaine (PF) (NAROPIN)(0.5%) 5 mg/mL injection, 25 mL  Med Admin Time: 2/11/2022 8:25 AM    Assessment:  Number of attempts:  1  Injection Assessment:  Incremental injection every 5 mL, local visualized surrounding nerve on ultrasound, negative aspiration for blood, no paresthesia and no intravascular symptoms  Patient tolerance:  Patient tolerated the procedure well with no immediate complications

## 2022-02-11 NOTE — DISCHARGE INSTRUCTIONS
After Hospital Care Plan:  Discharge Instructions Knee Replacement-Dr. Madilyn Brittle      Patient Name: Evi Reyeschure  Date of procedure: 2/11/2022   Procedure: Procedure(s):  LEFT TOTAL KNEE REPLACEMENT (MAC/SPINAL/BLOCK) (REQ FLIP)  Surgeon: Edmar Little) and Role:     Eufemia Newman MD (Jody) - Primary    PCP: Kleber Adler MD  Date of discharge: No discharge date for patient encounter. Diet  Regular diet or usual diet as at home. Drink plenty of fluids. Eat foods high in fiber and protein and low in fat. Avoid alcoholic beverages. Avoid smoking. Activities  You are going home a well person, so be as active as possible. Walk with your walker or crutches weight bearing as tolerated-wean as tolerated. Avoid low chairs and slippery surfaces. Avoid twisting your knee. Do not sit longer than 45 minutes at a time. During the daytime, get up every hour and take a brief walk. Exercises  Perform your exercise routine 3 times a day as instructed by the physical therapist.  Gradually increase the repetitions of the exercises. You may place an ice bag on your knee for 15-20 minutes after exercising. You should walk daily, each time lengthening your walking distance as your strength improves. Be sure to work on getting your knee out all the way straight. Do not place a pillow under your knee when resting. Medications  Take  Aspirin 81mg 1 tablet twice a day for 4 weeks. Take a multivitamin with iron once a day for a month. Take a stool softener daily (such as Senokot-S or Colace) to prevent constipation while you are taking iron and pain medication. If constipation occurs, take a laxative (such as Dulcolax tablets, Milk of Magnesia, or suppository). Take pain medication with food. You will find that you will decrease the amount you use as your pain lessens. You have been given Oxycodone 5mg tablets, Take 1-2 tablets every 4-6 hours as needed for pain.   It is ok to take Tylenol and Ibuprofen in addition to your pain meds  Please try to transition off of your Narcotic pain meds as soon as you are comfortable. Incision Care  You will have a waterproof dressing on the knee called Aquacel and it is OK to shower with the dressing in place. The Aquacel dressing will be removed 1 week after surgery. Ok to leave open to the air as long as it is not draining      If you have staples ; they will be removed 2 weeks after surgery by  your home health nurse  If there are steri-strips ; please leave them in place until they fall off. Cover your wound if you are having any drainage. Wear your elastic stockings for 4 weeks. You may remove them for approximately 1 hour when showering       Follow-up Office Visit  Post op appointment is with Latoya Brown PA-C ( Dr. Fred BOBO) on on 2/28/2022 at 9:20am. Please call the office if you need to change your follow -up appointment ([phone])    Reasons to call the Doctor  1. Increased redness, swelling or drainage from you incision site. 2.  Temperature consistently greater than 101 degrees. 3.  Increased pain or unrelieved pain. 4.  Calf or chest pain      Home Health/Home therapy  Physical Therapy-routine exercises, ROM,  gait training quad strengthening. 3 times in 10 day period   Remove Aquacel Dressing 1 week post op. (leave steri-strips in place iuntil they come off. Out Patient Physical therapy  You should expect to begin outpatient physical therapy approximately 2 -2 1/2 weeks after surgery. This should already be set up prior to your surgery. If it is not already scheduled, please call the office and speak with one of my assistants about setting up outpatient physical therapy. Other instructions  Do not take more than 4 Grams of Tylenol in 24 hours. Many pain medications contain Tylenol. Percocet contains 325mg of Tylenol per tablet;  Lortab contains 500mg of Tylenol per tablet, Norco contains 325 mg of Tylenol per tablet. Tylenol usage:  325 mg tablets DO NOT take more than 12 tablets in 24 hours                            500mg tablets DO NOT take more than 8 tablets in 24 hours    ______________________________________________________________________    Anesthesia Discharge Instructions    After general anesthesia or intervenous sedation, for 24 hours or while taking prescription Narcotics:  · Limit your activities  · Do not drive or operate hazardous machinery  · If you have not urinated within 8 hours after discharge, please contact your surgeon on call. · Do not make important personal or business decisions  · Do not drink alcoholic beverages    Report the following to your surgeon:  · Excessive pain, swelling, redness or odor of or around the surgical area  · Temperature over 100.5 degrees  · Nausea and vomiting lasting longer than 4 hours or if unable to take medication  · Any signs of decreased circulation or nerve impairment to extremity:  Change in color, persistent numbness, tingling, coldness or increased pain.   · Any questions

## 2022-02-11 NOTE — ANESTHESIA POSTPROCEDURE EVALUATION
Post-Anesthesia Evaluation and Assessment    Patient: Martha Aviles MRN: [id number]  SSN: [id number]    YOB: 1960  Age: 57 y.o. Sex: male      I have evaluated the patient and they are stable and ready for discharge from the PACU. Cardiovascular Function/Vital Signs  Visit Vitals  BP (!) 92/51 (BP 1 Location: Right upper arm, BP Patient Position: At rest)   Pulse 69   Temp (!) 35.7 °C (96.2 °F)   Resp 13   Ht 6' 1\" (1.854 m)   Wt 82.1 kg (181 lb)   SpO2 95%   BMI 23.88 kg/m²       Patient is status post MAC anesthesia for Procedure(s):  LEFT TOTAL KNEE REPLACEMENT (MAC/SPINAL/BLOCK) (REQ FLIP). Nausea/Vomiting: None    Postoperative hydration reviewed and adequate. Pain:  Pain Scale 1: Visual (02/11/22 1131)  Pain Intensity 1: 0 (02/11/22 1131)   Managed    Neurological Status:   Neuro (WDL): Exceptions to WDL (02/11/22 1131)  Neuro  LUE Motor Response: Purposeful (02/11/22 1131)  LLE Motor Response: Pharmacologically paralyzed (Spinal) (02/11/22 1131)  RUE Motor Response: Purposeful (02/11/22 1131)  RLE Motor Response: Pharmacologically paralyzed (Spinal) (02/11/22 1131)   At baseline    Mental Status, Level of Consciousness: Alert and  oriented to person, place, and time    Pulmonary Status:   O2 Device: Nasal cannula (02/11/22 1131)   Adequate oxygenation and airway patent    Complications related to anesthesia: None    Post-anesthesia assessment completed. No concerns    Signed By: Janeth Sr MD     February 11, 2022              Procedure(s):  LEFT TOTAL KNEE REPLACEMENT (MAC/SPINAL/BLOCK) (REQ FLIP). spinal    <BSHSIANPOST>    INITIAL Post-op Vital signs:   Vitals Value Taken Time   BP 92/56 02/11/22 1140   Temp 35.7 °C (96.2 °F) 02/11/22 1131   Pulse 86 02/11/22 1141   Resp 14 02/11/22 1141   SpO2 96 % 02/11/22 1141   Vitals shown include unvalidated device data.

## 2022-02-11 NOTE — PERIOP NOTES
I have reviewed discharge instructions with the patient. The patient verbalized understanding. Signs, symptoms, and side effects reviewed. Opportunity for questions and clarification allowed. Patient discharging home via private vehicle with home health orders in place.

## 2022-02-11 NOTE — PROGRESS NOTES
[facility] patient for discharge today. Home Health orders noted- Referrals sent to [facility] and accepted. Home Care orders noted- CM sent referral to [facility] via [facility]- and they are not able to accept. The patient will need to purchase any necessary DME for discharge. GABINO Nice      .

## 2022-02-11 NOTE — PROGRESS NOTES
Occupational Therapy    Order's acknowledged, chart reviewed; however, pt rec'd fully dressed sitting EOB and reports no OT needs, with good understanding of modified dressing techniques, as well as, reporting good PRN assist available, as well as, seated surface within his walk-in shower. Given pt declining need for acute OT evaluation, OT to sign off.     Thank you,  Reji Brock, OT

## 2022-02-11 NOTE — OP NOTES
Total Knee Operative Report        Patient: Robert Quarles MRN: [id number]  SSN: [id number]  YOB: 1960  Age: 57 y.o. Sex: male     DATE OF SURGERY:  2/11/2022     Indications: This is a 57 yrs male who presents with  left knee DJD. The  patient was admitted for surgery as conservative measures have failed.     Date of Surgery: 2/11/2022     Preoperative Diagnosis:  1. LEFT KNEE PRIMARY OSTEOARTHRITIS      2. NICKEL SENSITIVITY     Postoperative Diagnosis: 1. LEFT KNEE PRIMARY OSTEOARTHRITIS      2. NICKEL SENSITIVITY     Surgeon: Eufemia Painting MD (Jody)     Assistant: Claudene Poche, PA-C     Anesthesia: MAC     Procedure: Procedure(s): LEFT TOTAL KNEE REPLACEMENT      Procedure Details:  After the procedure had been explained to the patient  including the risks, benefits and possible complications, Robert Quarles  signed the informed consent. Robert Quarles was then brought to the  operating room and positioned on the operating table in a supine position and  was anethestized with anesthesia. A cardoso catheter was placed preoperatively  and IV Ancef 2gm  was administered. A pneumatic tourniquet was placed about the  limb and the left leg was prepped and draped in the usual sterile manner. The  tourniquet was inflated. Three to previous metal sensitivity it is his was made to use an Oxinium knee to decrease the likelihood he would have another reaction. An anterior longitudinal incision was accomplished  just medial to the tibial tubercle and extending approximal 6 centimeters  proximal to the superior pole of the patella. A medial parapatellar capsular  incision was performed. The medial capsular flap was elevated around to the  insertion of the semimembranous tendon. The patella was everted and the knee  flexed and externally rotated. The medial and lateral menisci were excised.   The lateral half of the fat pad excised and the patella femoral ligament was  released. The anterior cruciate ligament was resected and the posterior  cruciate ligament was substituted. The Knee was flexed to 90 degrees and an  intramedullary canal entry hole was drilled just anterior to the PCL insertion  on the femur. The intramedullary fauzia was inserted and the cutting guide used  this to reference for a resection of 11 mm off of the distal femur in approx 6  degrees of valgus. Using extramedullary instrumentation, the tibial cut was then  accomplished with three degrees of posterior slope. Approxiamately 2 mm of bone  was removed from the medial side of the tibia.       The flexion and extension gaps were assessed. The sizing guide for the  femoral component was then placed and a size 7 was chosen. The guide was set in  3 degrees external rotation to the epicondylar axis to create an equal flexion  gap. The appropriate cutting blocks were then utilized to perform the anterior,  Posterior, and  Chamfer cuts  with appropriate lateral translation accomplished.     The tibia was sized to a 6 component. The tibial base plate was pinned into  place and stem site prepared. The femoral trial with the box guide was set for  the posterior cruciate substituting prosthesis and these cuts were made also. Lug holes were drilled to accommodate the femoral component.        A preliminary range of motion was accomplished with the above size trial  components. A 13 millimeter polyethylene insert allowed the patient to obtain  full extension as well as appropriate flexion. The patient's ligaments were  stable in flexion and extension to medial and lateral stressing and the  alignment was through the appropriate mechanical axis. The patella was then  measured using the calipers and found to be 26 mm thick. Using the cutting  guide, 9mm were then resected to accommodate the size 35 patella three peg  button.  The appropriate guide was then used to drill the three pegs.     All trial components were removed and the cut surfaces prepared for cementing  with irrigation and debridement of the bone interstices. There were no femoral  deficiencies. There were no tibial deficiencies. No augmentation was utilized. Two package of cement were mixed and the permanent components cemented into  place. The femoral and tibial components were pressurized in full extension as  well as 70 degrees of flexion. The patella component was pressurized using the  patella clamp. Excess cement was using a curette. A combination of Exparel,  MSO4, Marcaine, Steroid and Saline was then injected into the entirety of the  capsule to assist with post-op pain relief. Once the cement was hardened, the  patella clamp was removed and the knee was copiously irrigated. Retrialing was  done and a size 5-6LTHK tibial polyethylene component  of 13mm thickness was  chosen.     Belgica Gaona knee was placed through range of motion and noted to be  stable as mentioned above with the trail components. The wound was dry,  therefore no drain was used. The capsular layer was closed using a #1 ethibond  suture and stratafix suture, while subcutaneous layers were closed using 2-0  Dexon interrupted sutures. Finally the skin was closed using Skin staples,  which were applied in occlusive fashion and sterile bandage applied. An Iceman  cryo pad was applied on the operative leg. The pneumatic tourniquet was  deflated. Sponge count and needle counts were correct. Belgica Gaona  left the operating room and went to the PACU in Stable Condition.     Helga Lopez PA-C was of vital assistance during the performance of the  procedure.  She was essential for positioning the knee for the various parts of  the procedure and assisting with the exposure of the knee, protection of vital  structures and the closure of the knee.     Tourniquet Time:  Total Tourniquet Time Documented:  Thigh (Left) - 94 minutes  Total: Thigh (Left) - 94 minutes        Implants:  Implant Name Type Inv. Item Serial No.  Lot No. LRB No. Used Action  CEMENT BNE 40GM FULL DOSE PMMA W/O GENT M VISC N RADPQ LNG - SNA  CEMENT BNE  40GM FULL DOSE PMMA W/O GENT M VISC N RADPQ LNG NA [employer]  [employer]_ [id number] Left 2 Implanted  COMPONENT FEM SZ 7 L KNEE OXINIUM CRUCE RET ALESSANDRA LEGION CR - SNA  COMPONENT FEM  SZ 7 L KNEE OXINIUM CRUCE RET ALESSANDRA LEGION CR NA [facility]  [id number] Left 1 Implanted  BASEPLATE TIB SZ 6 [id number] ML77MM THK2. 3MM L KNEE TI ALLY NP - SNA  BASEPLATE TIB  SZ 6 [id number] ML77MM THK2. 3MM L KNEE TI ALLY NP NA [employer] NEPH  [facility] [id number] Left 1 Implanted  PAT BCNVX UHMWPE 35MM -- MARTELL II - SNA  PAT BCNVX UHMWPE 35MM -- MARTELL II  NA POSEY [employer] NEPH ORTHOPEDIC [id number] Left 1 Implanted  INSERT TIB SZ 5-6 [id number] KNEEXLPE CRUC RET DP DSH LEGION - SNA  INSERT TIB SZ  5-6 [id number] KNEEXLPE CRUC RET DP DSH LEGION NA [employer]_  [id number] Left 1 Implanted        Femur Size: 7     Tibia Size: 6     Condition: Stable     Findings: DJD     Estimated Blood Loss:    100     Drains: None     Specimens: * No specimens in log *     Complications:  None; patient tolerated the procedure well     Signed By: Eufemia Dao MD

## 2022-02-11 NOTE — ANESTHESIA PREPROCEDURE EVALUATION
Relevant Problems   CARDIOVASCULAR   (+) LBBB (left bundle branch block)       Anesthetic History   No history of anesthetic complications  PONV          Review of Systems / Medical History  Patient summary reviewed, nursing notes reviewed and pertinent labs reviewed    Pulmonary  Within defined limits                 Neuro/Psych   Within defined limits           Cardiovascular  Within defined limits          Dysrhythmias            GI/Hepatic/Renal  Within defined limits              Endo/Other  Within defined limits      Arthritis     Other Findings              Physical Exam    Airway  Mallampati: II  TM Distance: > 6 cm  Neck ROM: normal range of motion   Mouth opening: Normal     Cardiovascular  Regular rate and rhythm,  S1 and S2 normal,  no murmur, click, rub, or gallop             Dental  No notable dental hx       Pulmonary  Breath sounds clear to auscultation               Abdominal  GI exam deferred       Other Findings            Anesthetic Plan    ASA: 2  Anesthesia type: spinal      Post-op pain plan if not by surgeon: peripheral nerve block single    Induction: Intravenous  Anesthetic plan and risks discussed with: Patient

## 2022-02-21 ENCOUNTER — HOSPITAL ENCOUNTER (OUTPATIENT)
Dept: ULTRASOUND IMAGING | Age: 62
Discharge: HOME | End: 2022-02-21
Attending: ORTHOPAEDIC SURGERY
Payer: COMMERCIAL

## 2022-02-21 ENCOUNTER — TRANSCRIBE ORDER (OUTPATIENT)
Dept: SCHEDULING | Age: 62
End: 2022-02-21

## 2022-02-21 DIAGNOSIS — T81.72XA: Primary | ICD-10-CM

## 2022-02-21 DIAGNOSIS — T81.72XA: ICD-10-CM

## 2022-02-21 DIAGNOSIS — I82.409: ICD-10-CM

## 2022-02-21 DIAGNOSIS — I82.409: Primary | ICD-10-CM

## 2022-02-21 PROCEDURE — 93971 EXTREMITY STUDY: CPT

## 2022-03-19 PROBLEM — M17.31 POST-TRAUMATIC OSTEOARTHRITIS OF RIGHT KNEE: Status: ACTIVE | Noted: 2018-11-01

## 2022-03-19 PROBLEM — I44.7 LBBB (LEFT BUNDLE BRANCH BLOCK): Status: ACTIVE | Noted: 2018-10-16

## 2022-03-19 PROBLEM — Z91.09 NICKEL ALLERGY: Status: ACTIVE | Noted: 2020-05-15

## 2022-03-19 PROBLEM — M17.11 OSTEOARTHRITIS OF RIGHT KNEE: Status: ACTIVE | Noted: 2018-11-02

## 2022-03-19 PROBLEM — Z96.651 PAINFUL TOTAL KNEE REPLACEMENT, RIGHT (HCC): Status: ACTIVE | Noted: 2020-05-15

## 2022-03-19 PROBLEM — T84.84XA PAINFUL TOTAL KNEE REPLACEMENT, RIGHT (HCC): Status: ACTIVE | Noted: 2020-05-15

## 2022-07-01 NOTE — PROGRESS NOTES
PHYSICAL THERAPY EVALUATION/DISCHARGE  Patient: Lashon Moreno (38 y.o. male)  Date: 2/11/2022  Primary Diagnosis: LEFT KNEE PRIMARY OSTEOARTHRITIS  Procedure(s) (LRB):  LEFT TOTAL KNEE REPLACEMENT (MAC/SPINAL/BLOCK) (REQ FLIP) (Left) Day of Surgery   Precautions: fall, WBAT LLE         ASSESSMENT  Based on the objective data described below, the patient presents with impaired L knee ROM, LLE pain, balance, weakness and gait dysfunction/intolerance causing limited independence with mobility s/p recent Left TKA POD #0. Pt PLOF: IND with ADLs and IADLs. Patient lives with wife in one story home, 4 steps to enter, right rail. Pt received in supine in [facility]. Pt's BP checked in supine, sitting, standing and walking, VSS. Pt performed gait training x4 stairs, CGA-SUP, with the use of bilateral hand rails and step to. Pt performed gait training x180ft rw, CGA - SUP for safety only, cues to keep walker rolling on ground rather than picking it up during advancement. Pt demonstrates step to gait pattern progressing to step through with shortened step length B. Pt voided in standing, SUP for safety only. Pt performed stand to sit EOB, rw SUP. During LE dressing pt able to perform with Maximus to put pants over L foot, verbally recalls proper way to dress from previous surgery. Pt able to perform sit to stand SUP without AD during LE dressing. Reviewed pain expectations during recovery, HEP and ice protocol. Pt is cleared for discharge from Physical Therapy standpoint at this time, no skilled PT recommend at this time. Will benefit from therapy in acute setting, anticipate will improve tolerance quickly with improved pain control. Functional Outcome Measure: The patient scored 95/100 on the barthel outcome measure which is indicative of independence. Pt scored 22/28 on the tinetti outcome measure which is indicative of moderate fall risk.        Other factors to consider for discharge:      Further skilled acute physical therapy is not indicated at this time. PLAN :  Recommendation for discharge: (in order for the patient to meet his/her long term goals)  No skilled physical therapy/ follow up rehabilitation needs identified at this time. This discharge recommendation:  Has been made in collaboration with the attending provider and/or case management    IF patient discharges home will need the following DME: patient owns DME required for discharge       SUBJECTIVE:   Patient stated i've been through all of this 3x before so I know how all of it works.     OBJECTIVE DATA SUMMARY:   HISTORY:    Past Medical History:   Diagnosis Date    Arthritis     KNEES, LEFT HAND-OSTEO    Chronic pain     Environmental and seasonal allergies     LBBB (left bundle branch block) 10/16/2018    Nausea & vomiting      Past Surgical History:   Procedure Laterality Date    HX CERVICAL FUSION  2006    HX GI      COLONOSCOPY    HX KNEE REPLACEMENT Right 2017, 2020    [address] [place] [address]    HX ORTHOPAEDIC Left 2009    HAND SURGERY     HX VASECTOMY  1983     Prior level of function: IND  Personal factors and/or comorbidities impacting plan of care: hx of R and L TKAs    Home Situation  # Steps to Enter: 4  Rails to Enter: Yes  Hand Rails : Right  One/Two Story Residence: One story  Living Alone: No  Support Systems: Spouse/Significant Other,Other Family Member(s)  Patient Expects to be Discharged to[de-identified] Home with home health  Current DME Used/Available at Home: None,Raised toilet seat,Cane, straight,Walker, rolling  Tub or Shower Type: Shower    EXAMINATION/PRESENTATION/DECISION MAKING:   Critical Behavior:  Orientation Level: Oriented X4  Skin:  intact x incision  Edema: none noted  Range Of Motion:  AROM: Generally decreased, functional  PROM: Generally decreased, functional  Strength:    Strength: Generally decreased, functional  Tone & Sensation:   Tone: Normal  Sensation: Intact  Coordination:  Coordination: Within functional limits  Functional Mobility:  Bed Mobility:  Rolling: Supervision  Supine to Sit: Contact guard assistance;Supervision  Scooting: Supervision  Transfers:  Sit to Stand: Contact guard assistance;Supervision  Stand to Sit: Contact guard assistance;Supervision  Balance:   Sitting: Intact  Standing: Intact; With support  Standing - Static: Good;Constant support  Standing - Dynamic : Good;Constant support  Ambulation/Gait Training:  Distance (ft): 180 Feet (ft)  Assistive Device: Walker, rolling;Gait belt  Ambulation - Level of Assistance: Contact guard assistance;Supervision  Gait Abnormalities: Step to gait (progress to step through)  Base of Support: Widened  Speed/Maira: Slow  Step Length: Right shortened;Left shortened   Stairs:  Number of Stairs Trained: 4  Stairs - Level of Assistance: Contact guard assistance;Supervision   Rail Use: Both  Functional Measure:  Tinetti test:    Sitting Balance: 1  Arises: 1  Attempts to Rise: 2  Immediate Standing Balance: 2  Standing Balance: 1  Nudged: 2  Eyes Closed: 1  Turn 360 Degrees - Continuous/Discontinuous: 1  Turn 360 Degrees - Steady/Unsteady: 1  Sitting Down: 2  Balance Score: 14 Balance total score  Indication of Gait: 1  R Step Length/Height: 1  L Step Length/Height: 1  R Foot Clearance: 1  L Foot Clearance: 1  Step Symmetry: 1  Step Continuity: 1  Path: 1  Trunk: 0  Walking Time: 0  Gait Score: 8 Gait total score  Total Score: 22/28 Overall total score         Tinetti Tool Score Risk of Falls  <19 = High Fall Risk  19-24 = Moderate Fall Risk  25-28 = Low Fall Risk  Tinetti ME. Performance-Oriented Assessment of Mobility Problems in Elderly Patients. Posey 66; [id number].  (Scoring Description: PT Bulletin Feb. 10, 1993)    Older adults: Naseem Barber et al, 2009; n = 1000 [facility] elderly evaluated with ABC, CARLOS A, ADL, and IADL)  · Mean CARLOS A score for males aged 69-68 years = 26.21(3.40)  · Mean CARLOS A score for females age 69-68 years = 25.16(4.30)  · Mean CARLOS A score for males over 80 years = 23.29(6.02)  · Mean CARLOS A score for females over 80 years = 17.20(8.32)      Barthel Index:    Bathin  Bladder: 10  Bowels: 10  Groomin  Dressin  Feeding: 10  Mobility: 15  Stairs: 10  Toilet Use: 10  Transfer (Bed to Chair and Back): 15  Total: 95/100       The Barthel ADL Index: Guidelines  1. The index should be used as a record of what a patient does, not as a record of what a patient could do. 2. The main aim is to establish degree of independence from any help, physical or verbal, however minor and for whatever reason. 3. The need for supervision renders the patient not independent. 4. A patient's performance should be established using the best available evidence. Asking the patient, friends/relatives and nurses are the usual sources, but direct observation and common sense are also important. However direct testing is not needed. 5. Usually the patient's performance over the preceding 24-48 hours is important, but occasionally longer periods will be relevant. 6. Middle categories imply that the patient supplies over 50 per cent of the effort. 7. Use of aids to be independent is allowed. Score Interpretation (from [address] [facility] 83)    Independent   60-79 Minimally independent   40-59 Partially dependent   20-39 Very dependent   <20 Totally dependent     -Ramos Damico., Barthel, D.W. (1965). Functional evaluation: the Barthel Index. [address] [facility] ([address] [place] [address] (1997). The Barthel activities of daily living index: self-reporting versus actual performance in the old (> or = 75 years). Journal [address] 45(7), [address] [facility], J.JSherieSherieF, Eusebio Morales., Lakshmi Woodward. (1999). Measuring the change in disability after inpatient rehabilitation; comparison of the responsiveness of the Barthel Index and Functional Jacob Measure. Journal of Neurology, Neurosurgery, and Psychiatry, 66(4), 807-674.   Nga Barrios TRESA, CATIE Norris, & Dinora Spaulding M.A. (2004) Assessment of post-stroke quality of life in cost-effectiveness studies: The usefulness of the Barthel Index and the EuroQoL-5D. Quality of Life Research, 15, 310-30           Physical Therapy Evaluation Charge Determination   History Examination Presentation Decision-Making   MEDIUM  Complexity : 1-2 comorbidities / personal factors will impact the outcome/ POC  MEDIUM Complexity : 3 Standardized tests and measures addressing body structure, function, activity limitation and / or participation in recreation  LOW Complexity : Stable, uncomplicated  Other outcome measures barthel  LOW       Based on the above components, the patient evaluation is determined to be of the following complexity level: LOW     Pain Ratin-6/10    Activity Tolerance:   Good      After treatment patient left in no apparent distress:   Sitting EOB with OT and FT nurses present    COMMUNICATION/EDUCATION:   The patients plan of care was discussed with: Occupational therapist and Registered nurse. Fall prevention education was provided and the patient/caregiver indicated understanding., Patient/family have participated as able in goal setting and plan of care. , and Patient/family agree to work toward stated goals and plan of care.     Thank you for this referral.  Mary Gutierrez, SPT   Time Calculation: 35 mins 01-Jul-2022

## 2023-05-11 RX ORDER — IBUPROFEN 200 MG
TABLET ORAL EVERY MORNING
COMMUNITY

## 2023-05-11 RX ORDER — MONTELUKAST SODIUM 10 MG/1
TABLET ORAL PRN
COMMUNITY

## 2023-05-11 RX ORDER — ASPIRIN 81 MG/1
1 TABLET, CHEWABLE ORAL DAILY
COMMUNITY
Start: 2022-02-11

## 2023-10-30 NOTE — PROGRESS NOTES
Bedside shift change report given to [place] (oncoming nurse) by Chandra Mendoza (offgoing nurse). Report included the following information SBAR, Kardex, Intake/Output and MAR. This patient developed new symptoms of substernal dyspepsia burping belching and discomfort.  He began airplane trip returning from [place].  He did not drink excess alcohol.  He did not get sick with diarrhea or any other symptoms.  He did not have any definite dysphagia.  Symptoms however are new and bothersome and kept him up all night.  He started Prevacid over-the-counter 1 daily and his symptoms have improved.He has no prior history of reflux disease dysphagia or other GI problems  In the past he had colonoscopy for colon cancer surveillance Currently he is improved with no severe pain melena or rectal bleeding continuing on Prevacid daily IMPRESSIONNew onset GERD symptoms rule out esophagitis pill esophagitis or benign or malignant upper GI pathology causing new change in symptoms PLANContinue Prevacid 30 mg dailyDiagnostic upper endoscopyWeight loss antireflux recommendations were madeGreenTesting test and then it keeps dictating and looks pretty good however 3 to transfer that did not the text if I try another window or if I closed the testing dictating   another box pops up inIf this which goes to the previous study GreenTesting test and then it keeps dictating and looks pretty good however 3 to transfer that did not the text if I try another window or if I closed the testing dictating

## (undated) DEVICE — TOTAL TRAY, 16FR 10ML SIL FOLEY, URN: Brand: MEDLINE

## (undated) DEVICE — 4-PORT MANIFOLD: Brand: NEPTUNE 2

## (undated) DEVICE — SYR LR LCK 1ML GRAD NSAF 30ML --

## (undated) DEVICE — SOLUTION IRRIG 3000ML 0.9% SOD CHL FLX CONT 0797208] ICU MEDICAL INC]

## (undated) DEVICE — CUSTOM CAST PD STR

## (undated) DEVICE — SUTURE VCRL SZ 2-0 L36IN ABSRB UD L40MM CT 1/2 CIR J957H

## (undated) DEVICE — SYRINGE MED 20ML STD CLR PLAS LUERLOCK TIP N CTRL DISP

## (undated) DEVICE — SOLUTION SURG PREP 26 CC PURPREP

## (undated) DEVICE — ZIMMER® STERILE DISPOSABLE TOURNIQUET CUFF WITH PLC, DUAL PORT, SINGLE BLADDER, 34 IN. (86 CM)

## (undated) DEVICE — 3M™ COBAN™ NL STERILE NON-LATEX SELF-ADHERENT WRAP, 2084S, 4 IN X 5 YD (10 CM X 4,5 M), 18 ROLLS/CASE: Brand: 3M™ COBAN™

## (undated) DEVICE — DRESSING HYDROCOLLOID BORDER 35X10 IN ALUM PRIMASEAL

## (undated) DEVICE — 3M™ IOBAN™ 2 ANTIMICROBIAL INCISE DRAPE 6651EZ: Brand: IOBAN™ 2

## (undated) DEVICE — SUTURE ETHBND EXCEL SZ 1 L30IN NONABSORBABLE GRN L36MM CT-1 X425H

## (undated) DEVICE — STRYKER PERFORMANCE SERIES SAGITTAL BLADE: Brand: STRYKER PERFORMANCE SERIES

## (undated) DEVICE — GLOVE SURG SZ 7 CRM LTX FREE POLYISOPRENE POLYMER BEAD ANTI

## (undated) DEVICE — SOLUTION IRRIG 1000ML H2O STRL BLT

## (undated) DEVICE — CARTRIDGE BNE CEM MIX UNIV TWR VAC ROTOR BRK OFF NOZ W/O

## (undated) DEVICE — CONTAINER,SPECIMEN,3OZ,OR STRL: Brand: MEDLINE

## (undated) DEVICE — INFECTION CONTROL KIT SYS

## (undated) DEVICE — Z CONVERTED USE 2271043 CONTAINER SPEC COLL 4OZ SCR ON LID PEEL PCH

## (undated) DEVICE — HANDPIECE SET WITH BONE CLEANING TIP AND SUCTION TUBE: Brand: INTERPULSE

## (undated) DEVICE — HANDLE LT SNAP ON ULT DURABLE LENS FOR TRUMPF ALC DISPOSABLE

## (undated) DEVICE — STERILE POLYISOPRENE POWDER-FREE SURGICAL GLOVES: Brand: PROTEXIS

## (undated) DEVICE — SUTURE STRATAFIX SYMMETRIC SZ 1 L18IN ABSRB VLT CT1 L36CM SXPP1A404

## (undated) DEVICE — SCRUB DRY SURG EZ SCRUB BRUSH PREOPERATIVE GRN

## (undated) DEVICE — NON RIMMED SPEED PIN 65MM STERILE

## (undated) DEVICE — SYR 20ML LL STRL LF --

## (undated) DEVICE — STERILE POLYISOPRENE POWDER-FREE SURGICAL GLOVES WITH EMOLLIENT COATING: Brand: PROTEXIS

## (undated) DEVICE — DRAPE,REIN 53X77,STERILE: Brand: MEDLINE

## (undated) DEVICE — GARMENT,MEDLINE,DVT,INT,CALF,MED, GEN2: Brand: MEDLINE

## (undated) DEVICE — BLADE SAW W083XL354IN THK0047IN CUT THK0047IN SAG FLR

## (undated) DEVICE — HOOK LOCK LATEX FREE ELASTIC BANDAGE D/L 6INX10YD

## (undated) DEVICE — NEEDLE HYPO 22GA L1.5IN BLK POLYPR HUB S STL REG BVL STR

## (undated) DEVICE — DRSG AQUACEL SURG 3.5 X 10IN -- CONVERT TO ITEM 370183

## (undated) DEVICE — SOLUTION IRRIG 1000ML STRL H2O USP PLAS POUR BTL

## (undated) DEVICE — SUTURE VCRL SZ 0 L27IN ABSRB UD L36MM CT-1 1/2 CIR J260H

## (undated) DEVICE — SPONGE LAP 18X18IN STRL -- 5/PK

## (undated) DEVICE — PADDING CAST SPEC 6INX4YD COT --

## (undated) DEVICE — SUTURE STRATAFIX SYMMETRIC PDS + SZ 1 L18IN ABSRB VLT L48MM SXPP1A400

## (undated) DEVICE — RIMMED SPEED PIN 45MM STERILE

## (undated) DEVICE — Device

## (undated) DEVICE — REM POLYHESIVE ADULT PATIENT RETURN ELECTRODE: Brand: VALLEYLAB

## (undated) DEVICE — GLOVE SURG SZ 8 L12IN FNGR THK94MIL STD WHT LTX FREE

## (undated) DEVICE — SUTURE MCRYL SZ 4 0 L18IN ABSRB UD PC 5 L19MM 3 8 CIR SGL Y823G

## (undated) DEVICE — PREP SKN PREVAIL 40ML APPL --

## (undated) DEVICE — BANDAGE COBAN 4 IN COMPR W4INXL5YD FOAM COHESIVE QUIK STK SELF ADH SFT

## (undated) DEVICE — GLOVE SURG SZ 65 CRM LTX FREE POLYISOPRENE POLYMER BEAD ANTI

## (undated) DEVICE — KENDALL SCD EXPRESS SLEEVES, KNEE LENGTH, MEDIUM: Brand: KENDALL SCD

## (undated) DEVICE — SUTURE MCRYL SZ 3-0 L27IN ABSRB UD L24MM PS-1 3/8 CIR PRIM Y936H

## (undated) DEVICE — TRAY CATH 16F URIN MTR LTX -- CONVERT TO ITEM 363111

## (undated) DEVICE — ANTERIOR TOTAL HIP-SMH: Brand: MEDLINE INDUSTRIES, INC.

## (undated) DEVICE — BANDAGE COMPR M W6INXL10YD WHT BGE VELC E MTRX HK AND LOOP

## (undated) DEVICE — DEVON™ KNEE AND BODY STRAP 60" X 3" (1.5 M X 7.6 CM): Brand: DEVON

## (undated) DEVICE — NEEDLE HYPO 21GA L1.5IN INTRAMUSCULAR S STL LATCH BVL UP

## (undated) DEVICE — GLOVE ORANGE PI 7   MSG9070

## (undated) DEVICE — SOLUTION IRRIG 3000ML 0.9% SOD CHL USP UROMATIC PLAS CONT

## (undated) DEVICE — STRAP,POSITIONING,KNEE/BODY,FOAM,4X60": Brand: MEDLINE

## (undated) DEVICE — COVER,TABLE,60X90,STERILE: Brand: MEDLINE

## (undated) DEVICE — YANKAUER,FLEXIBLE HANDLE,REGLR CAPACITY: Brand: MEDLINE INDUSTRIES, INC.